# Patient Record
Sex: FEMALE | Race: WHITE | Employment: PART TIME | URBAN - METROPOLITAN AREA
[De-identification: names, ages, dates, MRNs, and addresses within clinical notes are randomized per-mention and may not be internally consistent; named-entity substitution may affect disease eponyms.]

---

## 2018-05-22 ENCOUNTER — OFFICE VISIT (OUTPATIENT)
Dept: NEUROLOGY | Age: 43
End: 2018-05-22

## 2018-05-22 VITALS
OXYGEN SATURATION: 98 % | SYSTOLIC BLOOD PRESSURE: 120 MMHG | HEART RATE: 71 BPM | HEIGHT: 67 IN | WEIGHT: 246 LBS | BODY MASS INDEX: 38.61 KG/M2 | DIASTOLIC BLOOD PRESSURE: 80 MMHG

## 2018-05-22 DIAGNOSIS — R20.2 PARESTHESIA: ICD-10-CM

## 2018-05-22 DIAGNOSIS — G43.009 MIGRAINE WITHOUT AURA AND WITHOUT STATUS MIGRAINOSUS, NOT INTRACTABLE: Primary | ICD-10-CM

## 2018-05-22 RX ORDER — TOPIRAMATE 100 MG/1
TABLET, FILM COATED ORAL 2 TIMES DAILY
COMMUNITY
End: 2018-05-22 | Stop reason: DRUGHIGH

## 2018-05-22 RX ORDER — TOPIRAMATE 100 MG/1
100 CAPSULE, EXTENDED RELEASE ORAL DAILY
Qty: 30 CAP | Refills: 2 | Status: SHIPPED | OUTPATIENT
Start: 2018-05-22 | End: 2018-06-22 | Stop reason: SDUPTHER

## 2018-05-22 RX ORDER — RIZATRIPTAN BENZOATE 10 MG/1
10 TABLET ORAL
Qty: 9 TAB | Refills: 2 | Status: SHIPPED | OUTPATIENT
Start: 2018-05-22 | End: 2019-11-08 | Stop reason: SDUPTHER

## 2018-05-22 NOTE — PATIENT INSTRUCTIONS
1.  Start Trokendi  mg daily  2. Discontinue Topamax  3. Rizatriptan refilled  4. Follow-up in 3 months      A Healthy Lifestyle: Care Instructions  Your Care Instructions    A healthy lifestyle can help you feel good, stay at a healthy weight, and have plenty of energy for both work and play. A healthy lifestyle is something you can share with your whole family. A healthy lifestyle also can lower your risk for serious health problems, such as high blood pressure, heart disease, and diabetes. You can follow a few steps listed below to improve your health and the health of your family. Follow-up care is a key part of your treatment and safety. Be sure to make and go to all appointments, and call your doctor if you are having problems. It's also a good idea to know your test results and keep a list of the medicines you take. How can you care for yourself at home? · Do not eat too much sugar, fat, or fast foods. You can still have dessert and treats now and then. The goal is moderation. · Start small to improve your eating habits. Pay attention to portion sizes, drink less juice and soda pop, and eat more fruits and vegetables. ¨ Eat a healthy amount of food. A 3-ounce serving of meat, for example, is about the size of a deck of cards. Fill the rest of your plate with vegetables and whole grains. ¨ Limit the amount of soda and sports drinks you have every day. Drink more water when you are thirsty. ¨ Eat at least 5 servings of fruits and vegetables every day. It may seem like a lot, but it is not hard to reach this goal. A serving or helping is 1 piece of fruit, 1 cup of vegetables, or 2 cups of leafy, raw vegetables. Have an apple or some carrot sticks as an afternoon snack instead of a candy bar. Try to have fruits and/or vegetables at every meal.  · Make exercise part of your daily routine. You may want to start with simple activities, such as walking, bicycling, or slow swimming.  Try to be active 30 to 60 minutes every day. You do not need to do all 30 to 60 minutes all at once. For example, you can exercise 3 times a day for 10 or 20 minutes. Moderate exercise is safe for most people, but it is always a good idea to talk to your doctor before starting an exercise program.  · Keep moving. Erin [place] the lawn, work in the garden, or [place]. Take the stairs instead of the elevator at work. · If you smoke, quit. People who smoke have an increased risk for heart attack, stroke, cancer, and other lung illnesses. Quitting is hard, but there are ways to boost your chance of quitting tobacco for good. ¨ Use nicotine gum, patches, or lozenges. ¨ Ask your doctor about stop-smoking programs and medicines. ¨ Keep trying. In addition to reducing your risk of diseases in the future, you will notice some benefits soon after you stop using tobacco. If you have shortness of breath or asthma symptoms, they will likely get better within a few weeks after you quit. · Limit how much alcohol you drink. Moderate amounts of alcohol (up to 2 drinks a day for men, 1 drink a day for women) are okay. But drinking too much can lead to liver problems, high blood pressure, and other health problems. Family health  If you have a family, there are many things you can do together to improve your health. · Eat meals together as a family as often as possible. · Eat healthy foods. This includes fruits, vegetables, lean meats and dairy, and whole grains. · Include your family in your fitness plan. Most people think of activities such as jogging or tennis as the way to fitness, but there are many ways you and your family can be more active. Anything that makes you breathe hard and gets your heart pumping is exercise. Here are some tips:  ¨ Walk to do errands or to take your child to school or the bus. ¨ Go for a family bike ride after dinner instead of watching TV. Where can you learn more?   Go to http://milli-chris.info/. Enter Z351 in the search box to learn more about \"A Healthy Lifestyle: Care Instructions. \"  Current as of: May 12, 2017  Content Version: 11.4  © 8741-7303 Healthwise, [employer]. Care instructions adapted under license by [employer] (which disclaims liability or warranty for this information). If you have questions about a medical condition or this instruction, always ask your healthcare professional. [place] [address] any warranty or liability for your use of this information. Please be advised there is a $25 fee for all paperwork to be completed from our  providers. This is to be paid by the patient prior to picking up the completed forms.

## 2018-05-22 NOTE — PROGRESS NOTES
Neurology Note      18    Chief Complaint   Patient presents with    New Patient     Migraines        HPI/Subjective  Ritu Parnell is a 41 y.o. female who presented to the neurology office for management of headaches. The patient started having headaches when she was in college. With time it has been getting better. She usually gets a headache once a month and it is in the right occipital area and then it radiates to the frontal area. It is associated with photophobia and phonophobia. She does not get any nausea or vomiting now but in the past she has been nauseous with her headaches. She takes Maxalt and the headaches resolved in around 30 minutes. If she does not take any medications the headaches can last for 2 days. The headaches are 10 out of 10 in severity. She was living in [place] but moved to [place] in 2017. She is also having mild tingling in her feet. She does have history of kidney stones ×1 but still wants to continue to take the Topamax. Risk Factors for headaches  Smokin  Coffee: 2 cups/day  Tea: 0 cups/day  Soda: Rarely cans/day  Water: 8 glasses/day  Sleeps at 10:30 PM and wakes up at 6:30 AM . She does not snore. Medications tried  Abortive therapy:  Rizatriptan    Preventative therapy:  Topamax 100 mg p.o. nightly        Current Outpatient Prescriptions   Medication Sig    cetirizine HCl (ZYRTEC PO) Take  by mouth.  multivit-minerals/ferrous fum (MULTI VITAMIN PO) Take  by mouth.  rizatriptan (MAXALT) 10 mg tablet Take 1 Tab by mouth once as needed for Migraine for up to 1 dose. May repeat in 2 hours if needed    TROKENDI  mg capsule Take 1 Cap by mouth daily. No current facility-administered medications for this visit. Allergies   Allergen Reactions    Codeine Rash     Past Medical History:   Diagnosis Date    Neurological disorder      History reviewed. No pertinent surgical history. History reviewed.  No pertinent family history. Social History   Substance Use Topics    Smoking status: Never Smoker    Smokeless tobacco: Never Used    Alcohol use None       REVIEW OF SYSTEMS:   A ten system review of constitutional, cardiovascular, respiratory, musculoskeletal, endocrine, skin, SHEENT, genitourinary, psychiatric and neurologic systems was obtained and is unremarkable with the exception of Fatigue, frequent headaches, muscle pain, muscle weakness, neuropathy      EXAMINATION:   Visit Vitals    /80    Pulse 71    Ht 5' 7\" (1.702 m)    Wt 246 lb (111.6 kg)    SpO2 98%    BMI 38.53 kg/m2        General:   General appearance: Pt is in no acute distress   Distal pulses are preserved  Fundoscopic Exam: Normal    Neurological Examination:   Mental Status: AAO x3. Speech is fluent. Follows commands, has normal fund of knowledge, attention, short term recall, comprehension and insight. Cranial Nerves: Visual fields are full. PERRL, Extraocular movements are full. Facial sensation intact. Facial movement intact. Hearing intact to conversation. Palate elevates symmetrically. Shoulder shrug symmetric. Tongue midline. Motor: Strength is 5/5 in all 4 ext. No atrophy. Tone: Normal    Sensation: Normal to light touch    Reflexes: DTRs 2+ throughout. Coordination/Cerebellar: Intact to finger-nose-finger     Gait: Casual gait is normal.     Skin: No significant bruising or lacerations. Laboratory review:   No results found for this or any previous visit. Imaging review:  None    Documentation review:  None    Assessment/Plan:   Sobia Mckeon is a 41 y.o. female who presented to the neurology office for management of migraines without aura. She is presently taking Topamax 100 mg p.o. nightly and rizatriptan 10 mg as needed. With the medications are helping her but she is having paresthesia in her feet which could be secondary Topamax. We will switch her Topamax to Trokendi 100 mg p.o. daily.   Have refilled her rizatriptan and Trokendi for 3 months. Follow-up in 3 months      PHQ over the last two weeks 5/22/2018   Little interest or pleasure in doing things Not at all   Feeling down, depressed or hopeless Not at all   Total Score PHQ 2 0     Primary care to address possible depression if PHQ-9 score is more than 9. ICD-10-CM ICD-9-CM    1. Migraine without aura and without status migrainosus, not intractable G43.009 346.10 rizatriptan (MAXALT) 10 mg tablet      TROKENDI  mg capsule   2. Paresthesia R20.2 782.0       Thank you for allowing me to participate in the care of Ms. Martinez. Please feel free to contact me if you have any questions. Monico Barbosa MD  Neurologist    CC: No primary care provider on file. Fax: None    This note was created using voice recognition software. Despite editing, there may be syntax errors.

## 2018-05-22 NOTE — MR AVS SNAPSHOT
[address], 
[address], [address] [facility] 
[phone] Patient: Roberta Carvalho MRN: [id number] :1975 Visit Information Date & Time Provider Department Dept. Phone Encounter #  
 2018 11:00 AM Raul Garcia MD [facility]rology [facility] at [facility] [phone] [id number] Upcoming Health Maintenance Date Due DTaP/Tdap/Td series (1 - Tdap) 1998 PAP AKA CERVICAL CYTOLOGY 1998 Influenza Age 5 to Adult 2018 Allergies as of 2018  Review Complete On: 2018 By: Raul Garcia MD  
  
 Severity Noted Reaction Type Reactions Codeine  2018    Rash Current Immunizations  Never Reviewed No immunizations on file. Not reviewed this visit You Were Diagnosed With   
  
 Codes Comments Migraine without aura and without status migrainosus, not intractable    -  Primary ICD-10-CM: G43.009 ICD-9-CM: 346.10 Vitals BP Pulse Height(growth percentile) Weight(growth percentile) SpO2 BMI  
 120/80 71 5' 7\" (1.702 m) 246 lb (111.6 kg) 98% 38.53 kg/m2 Smoking Status Never Smoker BMI and BSA Data Body Mass Index Body Surface Area 38.53 kg/m 2 2.3 m 2 Preferred Pharmacy Pharmacy Name Phone [id number] [place], [address] [phone] Your Updated Medication List  
  
   
This list is accurate as of 18 11:41 AM.  Always use your most recent med list.  
  
  
  
  
 MULTI VITAMIN PO Take  by mouth.  
  
 rizatriptan 10 mg tablet Commonly known as:  Kalyani Mighty Take 1 Tab by mouth once as needed for Migraine for up to 1 dose. May repeat in 2 hours if needed TROKENDI  mg capsule Generic drug:  topiramate ER Take 1 Cap by mouth daily. ZYRTEC PO Take  by mouth. Prescriptions Sent to Pharmacy Refills  
 rizatriptan (MAXALT) 10 mg tablet 2 Sig: Take 1 Tab by mouth once as needed for Migraine for up to 1 dose. May repeat in 2 hours if needed Class: Normal  
 Pharmacy: [facility]  #: [phone] Route: Oral  
 TROKENDI  mg capsule 2 Sig: Take 1 Cap by mouth daily. Class: Normal  
 Pharmacy: [facility] Ph #: [phone] Route: Oral  
  
Patient Instructions 1. Start Trokendi  mg daily 2. Discontinue Topamax 3. Rizatriptan refilled 4. Follow-up in 3 months A Healthy Lifestyle: Care Instructions Your Care Instructions A healthy lifestyle can help you feel good, stay at a healthy weight, and have plenty of energy for both work and play. A healthy lifestyle is something you can share with your whole family. A healthy lifestyle also can lower your risk for serious health problems, such as high blood pressure, heart disease, and diabetes. You can follow a few steps listed below to improve your health and the health of your family. Follow-up care is a key part of your treatment and safety. Be sure to make and go to all appointments, and call your doctor if you are having problems. It's also a good idea to know your test results and keep a list of the medicines you take. How can you care for yourself at home? · Do not eat too much sugar, fat, or fast foods. You can still have dessert and treats now and then. The goal is moderation. · Start small to improve your eating habits. Pay attention to portion sizes, drink less juice and soda pop, and eat more fruits and vegetables. ¨ Eat a healthy amount of food. A 3-ounce serving of meat, for example, is about the size of a deck of cards. Fill the rest of your plate with vegetables and whole grains. ¨ Limit the amount of soda and sports drinks you have every day. Drink more water when you are thirsty. ¨ Eat at least 5 servings of fruits and vegetables every day. It may seem like a lot, but it is not hard to reach this goal. A serving or helping is 1 piece of fruit, 1 cup of vegetables, or 2 cups of leafy, raw vegetables. Have an apple or some carrot sticks as an afternoon snack instead of a candy bar. Try to have fruits and/or vegetables at every meal. 
· Make exercise part of your daily routine. You may want to start with simple activities, such as walking, bicycling, or slow swimming. Try to be active 30 to 60 minutes every day. You do not need to do all 30 to 60 minutes all at once. For example, you can exercise 3 times a day for 10 or 20 minutes. Moderate exercise is safe for most people, but it is always a good idea to talk to your doctor before starting an exercise program. 
· Keep moving. [place] the lawn, work in the garden, or [employer]. Take the stairs instead of the elevator at work. · If you smoke, quit. People who smoke have an increased risk for heart attack, stroke, cancer, and other lung illnesses. Quitting is hard, but there are ways to boost your chance of quitting tobacco for good. ¨ Use nicotine gum, patches, or lozenges. ¨ Ask your doctor about stop-smoking programs and medicines. ¨ Keep trying. In addition to reducing your risk of diseases in the future, you will notice some benefits soon after you stop using tobacco. If you have shortness of breath or asthma symptoms, they will likely get better within a few weeks after you quit. · Limit how much alcohol you drink. Moderate amounts of alcohol (up to 2 drinks a day for men, 1 drink a day for women) are okay. But drinking too much can lead to liver problems, high blood pressure, and other health problems. Family health If you have a family, there are many things you can do together to improve your health. · Eat meals together as a family as often as possible. · Eat healthy foods. This includes fruits, vegetables, lean meats and dairy, and whole grains. · Include your family in your fitness plan. Most people think of activities such as jogging or tennis as the way to fitness, but there are many ways you and your family can be more active. Anything that makes you breathe hard and gets your heart pumping is exercise. Here are some tips: 
¨ Walk to do errands or to take your child to school or the bus. ¨ Go for a family bike ride after dinner instead of watching TV. Where can you learn more? Go to http://milli-chris.info/. Enter C048 in the search box to learn more about \"A Healthy Lifestyle: Care Instructions. \" Current as of: May 12, 2017 Content Version: 11.4 © 2054-7979 [employer]. Care instructions adapted under license by [employer] (which disclaims liability or warranty for this information). If you have questions about a medical condition or this instruction, always ask your healthcare professional. [address] any warranty or liability for your use of this information. Please be advised there is a $25 fee for all paperwork to be completed from our  providers. This is to be paid by the patient prior to picking up the completed forms. Introducing [facility] & HEALTH SERVICES! Rossy Lang introduces [employer] patient portal. Now you can access parts of your medical record, email your doctor's office, and request medication refills online. 1. In your internet browser, go to [email or web address] 2. Click on the First Time User? Click Here link in the Sign In box. You will see the New Member Sign Up page. 3. Enter your Nualight Access Code exactly as it appears below. You will not need to use this code after youve completed the sign-up process.  If you do not sign up before the expiration date, you must request a new code. 
 
· [employer] Access Code: [email or web address] Expires: 8/20/2018 10:35 AM 
 
4. Enter the last four digits of your Social Security Number (xxxx) and Date of Birth (mm/dd/yyyy) as indicated and click Submit. You will be taken to the next sign-up page. 5. Create a [employer] ID. This will be your [employer] login ID and cannot be changed, so think of one that is secure and easy to remember. 6. Create a [employer] password. You can change your password at any time. 7. Enter your Password Reset Question and Answer. This can be used at a later time if you forget your password. 8. Enter your e-mail address. You will receive e-mail notification when new information is available in [address] 9. Click Sign Up. You can now view and download portions of your medical record. 10. Click the Download Summary menu link to download a portable copy of your medical information. If you have questions, please visit the Frequently Asked Questions section of the [employer] website. Remember, [employer] is NOT to be used for urgent needs. For medical emergencies, dial 911. Now available from your iPhone and Android! Please provide this summary of care documentation to your next provider. If you have any questions after today's visit, please call [phone].

## 2018-06-22 DIAGNOSIS — G43.009 MIGRAINE WITHOUT AURA AND WITHOUT STATUS MIGRAINOSUS, NOT INTRACTABLE: ICD-10-CM

## 2018-06-22 RX ORDER — TOPIRAMATE 100 MG/1
100 CAPSULE, EXTENDED RELEASE ORAL DAILY
Qty: 90 CAP | Refills: 0 | Status: SHIPPED | OUTPATIENT
Start: 2018-06-22 | End: 2018-10-16 | Stop reason: SDUPTHER

## 2018-06-22 NOTE — TELEPHONE ENCOUNTER
[facility] to [facility] a [facility] the patient's \"Trokendi\" they stated that patient is requesting a 90 day supply.       [phone]

## 2018-08-22 ENCOUNTER — OFFICE VISIT (OUTPATIENT)
Dept: NEUROLOGY | Age: 43
End: 2018-08-22

## 2018-08-22 VITALS
BODY MASS INDEX: 38.77 KG/M2 | OXYGEN SATURATION: 98 % | SYSTOLIC BLOOD PRESSURE: 118 MMHG | WEIGHT: 247 LBS | HEART RATE: 72 BPM | HEIGHT: 67 IN | DIASTOLIC BLOOD PRESSURE: 78 MMHG

## 2018-08-22 DIAGNOSIS — G43.009 MIGRAINE WITHOUT AURA AND WITHOUT STATUS MIGRAINOSUS, NOT INTRACTABLE: Primary | ICD-10-CM

## 2018-08-22 DIAGNOSIS — R20.2 PARESTHESIA: ICD-10-CM

## 2018-08-22 NOTE — MR AVS SNAPSHOT
[address], 
[address], [address] [facility] 
[phone] Patient: Ger Hernandez MRN: [id number] :1975 Visit Information Date & Time Provider Department Dept. Phone Encounter #  
 2018 11:00 AM Magalys Wilburn MD Neurology Clinic at [facility] [phone] [id number] Your Appointments 2019 11:00 AM  
Follow Up with Magalys Wilburn MD  
[facility]: follow up headaches 18 [address] [facility], 
[address] [place] [address], [address] [address]  
[address] [facility], [address] [place] [address], [address] [address] Upcoming Health Maintenance Date Due DTaP/Tdap/Td series (1 - Tdap) 1998 PAP AKA CERVICAL CYTOLOGY 1998 Influenza Age 5 to Adult 2018 Allergies as of 2018  Review Complete On: 2018 By: Magalys Wilburn MD  
  
 Severity Noted Reaction Type Reactions Codeine  2018    Rash Current Immunizations  Never Reviewed No immunizations on file. Not reviewed this visit You Were Diagnosed With   
  
 Codes Comments Migraine without aura and without status migrainosus, not intractable    -  Primary ICD-10-CM: G43.009 ICD-9-CM: 346.10 Paresthesia     ICD-10-CM: R20.2 ICD-9-CM: 010. 0 Vitals BP Pulse Height(growth percentile) Weight(growth percentile) SpO2 BMI  
 118/78 72 5' 7\" (1.702 m) 247 lb (112 kg) 98% 38.69 kg/m2 Smoking Status Never Smoker BMI and BSA Data Body Mass Index Body Surface Area  
 38.69 kg/m 2 2.3 m 2 Preferred Pharmacy Pharmacy Name Phone [id number] [place] [address] [phone] Your Updated Medication List  
  
   
 This list is accurate as of 8/22/18 11:48 AM.  Always use your most recent med list.  
  
  
  
  
 MULTI VITAMIN PO Take  by mouth.  
  
 rizatriptan 10 mg tablet Commonly known as:  Madelyn Warner Take 1 Tab by mouth once as needed for Migraine for up to 1 dose. May repeat in 2 hours if needed TROKENDI  mg capsule Generic drug:  topiramate ER Take 1 Cap by mouth daily. ZYRTEC PO Take  by mouth. Patient Instructions 1. Follow-up in 6 months Office Policies · Phone calls/patient messages: Please allow up to 24 hours for someone in the office to contact you about your call or message. Be mindful your provider may be out of the office or your message may require further review. We encourage you to use [employer] for your messages as this is a faster, more efficient way to communicate with our office · Medication Refills: 
Prescription medications require up to 48 business hours to process. We encourage you to use [employer] for your refills. For controlled medications: Please allow up to 72 business hours to process. Certain medications may require you to  a written prescription at our office. NO narcotic/controlled medications will be prescribed after 4pm Monday through Friday or on weekends · Form/Paperwork Completion: 
Please note there is a $25 fee for all paperwork completed by our providers. We ask that you allow 7-14 business days. Pre-payment is due prior to picking up/faxing the completed form. You may also download your forms to [employer] to have your doctor print off. Introducing \[facility]\"" & HEALTH SERVICES! [employer] introduces [employer] patient portal. Now you can access parts of your medical record, email your doctor's office, and request medication refills online. 1. In your internet browser, go to [email or web address] 2. Click on the First Time User? Click Here link in the Sign In box. You will see the New Member Sign Up page. 3. Enter your IPDIA Access Code exactly as it appears below. You will not need to use this code after youve completed the sign-up process. If you do not sign up before the expiration date, you must request a new code. · IPDIA Access Code: [email or web address] Expires: 11/20/2018 11:48 AM 
 
4. Enter the last four digits of your Social Security Number (xxxx) and Date of Birth (mm/dd/yyyy) as indicated and click Submit. You will be taken to the next sign-up page. 5. Create a IPDIA ID. This will be your IPDIA login ID and cannot be changed, so think of one that is secure and easy to remember. 6. Create a IPDIA password. You can change your password at any time. 7. Enter your Password Reset Question and Answer. This can be used at a later time if you forget your password. 8. Enter your e-mail address. You will receive e-mail notification when new information is available in [address] 9. Click Sign Up. You can now view and download portions of your medical record. 10. Click the Download Summary menu link to download a portable copy of your medical information. If you have questions, please visit the Frequently Asked Questions section of the IPDIA website. Remember, I[employer]IA is NOT to be used for urgent needs. For medical emergencies, dial 911. Now available from your iPhone and Android! Please provide this summary of care documentation to your next provider. If you have any questions after today's visit, please call [phone].

## 2018-08-22 NOTE — PATIENT INSTRUCTIONS
1.  Follow-up in 6 months    Office Policies  · Phone calls/patient messages:  Please allow up to 24 hours for someone in the office to contact you about your call or message. Be mindful your provider may be out of the office or your message may require further review. We encourage you to use [employer] for your messages as this is a faster, more efficient way to communicate with our office  · Medication Refills:  Prescription medications require up to 48 business hours to process. We encourage you to use [employer] for your refills. For controlled medications: Please allow up to 72 business hours to process. Certain medications may require you to  a written prescription at our office. NO narcotic/controlled medications will be prescribed after 4pm Monday through Friday or on weekends  · Form/Paperwork Completion:  Please note there is a $25 fee for all paperwork completed by our providers. We ask that you allow 7-14 business days. Pre-payment is due prior to picking up/faxing the completed form. You may also download your forms to [employer] to have your doctor print off.

## 2019-02-19 ENCOUNTER — OFFICE VISIT (OUTPATIENT)
Dept: NEUROLOGY | Age: 44
End: 2019-02-19

## 2019-02-19 VITALS
OXYGEN SATURATION: 98 % | SYSTOLIC BLOOD PRESSURE: 128 MMHG | DIASTOLIC BLOOD PRESSURE: 75 MMHG | WEIGHT: 247 LBS | BODY MASS INDEX: 38.77 KG/M2 | HEART RATE: 74 BPM | HEIGHT: 67 IN

## 2019-02-19 DIAGNOSIS — G43.009 MIGRAINE WITHOUT AURA AND WITHOUT STATUS MIGRAINOSUS, NOT INTRACTABLE: Primary | ICD-10-CM

## 2019-02-19 DIAGNOSIS — R20.2 PARESTHESIA: ICD-10-CM

## 2019-02-19 NOTE — LETTER
2019 11:15 AM 
 
Patient:    Elizabeth White YOB: 1975 Date of Visit:    2019 Dear No primary care provider on file. Thank you for referring Ms. Elizabeth White to me for evaluation/treatment. Below are the relevant portions of my assessment and plan of care. Neurology Note Chief Complaint Patient presents with  Follow-up  
  headache HPI/Subjective Elizabeth White is a 41 y.o. female who presented to the neurology office for management of headaches. The patient started having headaches when she was in college. With time it has been getting better. She usually gets a headache once a month and it is in the right occipital area and then it radiates to the frontal area. It is associated with photophobia and phonophobia. She does not get any nausea or vomiting now but in the past she has been nauseous with her headaches. She takes Maxalt and the headaches resolved in around 30 minutes. If she does not take any medications the headaches can last for 2 days. The headaches are 10 out of 10 in severity. She was living in [place] but moved to [place] in 2017. She does have history of kidney stones ×1 but still wants to continue to take the Trokendi. Risk Factors for headaches Smokin Coffee: 2 cups/day Tea: 0 cups/day Soda: Rarely cans/day Water: 8 glasses/day Sleeps at 10:30 PM and wakes up at 6:30 AM . She does not snore. Medications tried Abortive therapy: 
Rizatriptan Preventative therapy: 
Topamax 100 mg p.o. nightly Current Outpatient Medications Medication Sig  TROKENDI  mg capsule TAKE 1 CAPSULE BY MOUTH EVERY DAY  cetirizine HCl (ZYRTEC PO) Take  by mouth.  multivit-minerals/ferrous fum (MULTI VITAMIN PO) Take  by mouth.  rizatriptan (MAXALT) 10 mg tablet Take 1 Tab by mouth once as needed for Migraine for up to 1 dose. May repeat in 2 hours if needed No current facility-administered medications for this visit. Allergies Allergen Reactions  Codeine Rash Past Medical History:  
Diagnosis Date  Neurological disorder No past surgical history on file. No family history on file. Social History Tobacco Use  Smoking status: Never Smoker  Smokeless tobacco: Never Used Substance Use Topics  Alcohol use: Not on file  Drug use: Not on file REVIEW OF SYSTEMS:  
A ten system review of constitutional, cardiovascular, respiratory, musculoskeletal, endocrine, skin, SHEENT, genitourinary, psychiatric and neurologic systems was obtained and is unremarkable with the exception of Fatigue, frequent headaches, muscle pain, muscle weakness, neuropathy EXAMINATION:  
Visit Vitals /75 Pulse 74 Ht 5' 7\" (1.702 m) Wt 247 lb (112 kg) SpO2 98% BMI 38.69 kg/m² General:  
General appearance: Pt is in no acute distress Distal pulses are preserved Neurological Examination:  
Mental Status: AAO x3. Speech is fluent. Follows commands, has normal fund of knowledge, attention, short term recall, comprehension and insight. Cranial Nerves: Visual fields are full. PERRL, Extraocular movements are full. Facial sensation intact. Facial movement intact. Hearing intact to conversation. Palate elevates symmetrically. Shoulder shrug symmetric. Tongue midline. Motor: Strength is 5/5 in all 4 ext. No atrophy. Tone: Normal 
 
Sensation: Normal to light touch Coordination/Cerebellar: Intact to finger-nose-finger Gait: Casual gait is normal.  
 
Skin: No significant bruising or lacerations. Laboratory review: No results found for this or any previous visit. Imaging review: 
None Documentation review: 
None Assessment/Plan: Nelson Burton is a 41 y.o. female who presented to the neurology office for management of migraines without aura.   She is presently taking Trokendi 100 mg p.o. daily. Paresthesia has improved. She takes rizatriptan prn. Overall headaches are well controlled. Follow-up in 6 months Over 25 minutes was spent with the patient of which > 50% of the visit was spent counseling on diagnosis, management, and treatment of the diagnosis. I did discuss about Trokendi and kidney stones. 3 most recent PHQ Screens 5/22/2018 Little interest or pleasure in doing things Not at all Feeling down, depressed, irritable, or hopeless Not at all Total Score PHQ 2 0 Primary care to address possible depression if PHQ-9 score is more than 9. ICD-10-CM ICD-9-CM 1. Migraine without aura and without status migrainosus, not intractable G43.009 346.10   
2. Paresthesia R20.2 782.0 Thank you for allowing me to participate in the care of Ms. Martinez. Please feel free to contact me if you have any questions. Jenna Freeman MD 
Neurologist 
 
CC: No primary care provider on file. Fax: None This note was created using voice recognition software. Despite editing, there may be syntax errors. If you have questions, please do not hesitate to call me. I look forward to following Ms. Martinez along with you. Sincerely, Jenna Freeman MD

## 2019-02-19 NOTE — PROGRESS NOTES
Neurology Note      Chief Complaint   Patient presents with    Follow-up     headache       HPI/Subjective  Verlena Coma is a 41 y.o. female who presented to the neurology office for management of headaches. The patient started having headaches when she was in college. With time it has been getting better. She usually gets a headache once a month and it is in the right occipital area and then it radiates to the frontal area. It is associated with photophobia and phonophobia. She does not get any nausea or vomiting now but in the past she has been nauseous with her headaches. She takes Maxalt and the headaches resolved in around 30 minutes. If she does not take any medications the headaches can last for 2 days. The headaches are 10 out of 10 in severity. She was living in [place] but moved to [facility] in 2017. She does have history of kidney stones ×1 but still wants to continue to take the Trokendi. Risk Factors for headaches  Smokin  Coffee: 2 cups/day  Tea: 0 cups/day  Soda: Rarely cans/day  Water: 8 glasses/day  Sleeps at 10:30 PM and wakes up at 6:30 AM . She does not snore. Medications tried  Abortive therapy:  Rizatriptan    Preventative therapy:  Topamax 100 mg p.o. nightly    Current Outpatient Medications   Medication Sig    TROKENDI  mg capsule TAKE 1 CAPSULE BY MOUTH EVERY DAY    cetirizine HCl (ZYRTEC PO) Take  by mouth.  multivit-minerals/ferrous fum (MULTI VITAMIN PO) Take  by mouth.  rizatriptan (MAXALT) 10 mg tablet Take 1 Tab by mouth once as needed for Migraine for up to 1 dose. May repeat in 2 hours if needed     No current facility-administered medications for this visit. Allergies   Allergen Reactions    Codeine Rash     Past Medical History:   Diagnosis Date    Neurological disorder      No past surgical history on file. No family history on file.   Social History     Tobacco Use    Smoking status: Never Smoker    Smokeless tobacco: Never Used   Substance Use Topics    Alcohol use: Not on file    Drug use: Not on file       REVIEW OF SYSTEMS:   A ten system review of constitutional, cardiovascular, respiratory, musculoskeletal, endocrine, skin, SHEENT, genitourinary, psychiatric and neurologic systems was obtained and is unremarkable with the exception of Fatigue, frequent headaches, muscle pain, muscle weakness, neuropathy      EXAMINATION:   Visit Vitals  /75   Pulse 74   Ht 5' 7\" (1.702 m)   Wt 247 lb (112 kg)   SpO2 98%   BMI 38.69 kg/m²        General:   General appearance: Pt is in no acute distress   Distal pulses are preserved    Neurological Examination:   Mental Status: AAO x3. Speech is fluent. Follows commands, has normal fund of knowledge, attention, short term recall, comprehension and insight. Cranial Nerves: Visual fields are full. PERRL, Extraocular movements are full. Facial sensation intact. Facial movement intact. Hearing intact to conversation. Palate elevates symmetrically. Shoulder shrug symmetric. Tongue midline. Motor: Strength is 5/5 in all 4 ext. No atrophy. Tone: Normal    Sensation: Normal to light touch     Coordination/Cerebellar: Intact to finger-nose-finger     Gait: Casual gait is normal.     Skin: No significant bruising or lacerations. Laboratory review:   No results found for this or any previous visit. Imaging review:  None    Documentation review:  None    Assessment/Plan:   Radha Rosenberg is a 41 y.o. female who presented to the neurology office for management of migraines without aura. She is presently taking Trokendi 100 mg p.o. daily. Paresthesia has improved. She takes rizatriptan prn. Overall headaches are well controlled. Follow-up in 6 months    Over 25 minutes was spent with the patient of which > 50% of the visit was spent counseling on diagnosis, management, and treatment of the diagnosis. I did discuss about Trokendi and kidney stones.       3 most recent PHQ Screens 5/22/2018   Little interest or pleasure in doing things Not at all   Feeling down, depressed, irritable, or hopeless Not at all   Total Score PHQ 2 0     Primary care to address possible depression if PHQ-9 score is more than 9. ICD-10-CM ICD-9-CM    1. Migraine without aura and without status migrainosus, not intractable G43.009 346.10    2. Paresthesia R20.2 782.0       Thank you for allowing me to participate in the care of Ms. Martinez. Please feel free to contact me if you have any questions. Dayna Ward MD  Neurologist    CC: No primary care provider on file. Fax: None    This note was created using voice recognition software. Despite editing, there may be syntax errors.

## 2019-02-19 NOTE — PATIENT INSTRUCTIONS
Office Policies  · Phone calls/patient messages:  Please allow up to 24 hours for someone in the office to contact you about your call or message. Be mindful your provider may be out of the office or your message may require further review. We encourage you to use [email or web address] for your messages as this is a faster, more efficient way to communicate with our office  · Medication Refills:  Prescription medications require up to 48 business hours to process. We encourage you to use [email or web address] for your refills. For controlled medications: Please allow up to 72 business hours to process. Certain medications may require you to  a written prescription at our office. NO narcotic/controlled medications will be prescribed after 4pm Monday through Friday or on weekends  · Form/Paperwork Completion:  Please note there is a $25 fee for all paperwork completed by our providers. We ask that you allow 7-14 business days. Pre-payment is due prior to picking up/faxing the completed form. You may also download your forms to [email or web address] to have your doctor print off.

## 2019-07-24 DIAGNOSIS — G43.009 MIGRAINE WITHOUT AURA AND WITHOUT STATUS MIGRAINOSUS, NOT INTRACTABLE: ICD-10-CM

## 2019-07-31 NOTE — TELEPHONE ENCOUNTER
Received refill 90 day request for luz elena   Last filled 10/16/18   Follow up appt.  With dr. Pedro Grant 8/21/19   Dr. Pedro Grant, please refill   l

## 2019-08-01 RX ORDER — TOPIRAMATE 100 MG/1
CAPSULE, EXTENDED RELEASE ORAL
Qty: 90 CAP | Refills: 3 | Status: SHIPPED | OUTPATIENT
Start: 2019-08-01 | End: 2020-10-15

## 2019-08-14 ENCOUNTER — OFFICE VISIT (OUTPATIENT)
Dept: NEUROLOGY | Age: 44
End: 2019-08-14

## 2019-08-14 VITALS
HEIGHT: 67 IN | SYSTOLIC BLOOD PRESSURE: 142 MMHG | WEIGHT: 245 LBS | HEART RATE: 68 BPM | DIASTOLIC BLOOD PRESSURE: 82 MMHG | OXYGEN SATURATION: 98 % | BODY MASS INDEX: 38.45 KG/M2

## 2019-08-14 DIAGNOSIS — G43.009 MIGRAINE WITHOUT AURA AND WITHOUT STATUS MIGRAINOSUS, NOT INTRACTABLE: ICD-10-CM

## 2019-08-14 DIAGNOSIS — R20.2 PARESTHESIA: Primary | ICD-10-CM

## 2019-08-14 NOTE — PROGRESS NOTES
Neurology Note      Chief Complaint   Patient presents with    Follow-up     migraine       HPI/Subjective  Daniel Bermudez is a 45 y.o. female who presented to the neurology office for management of headaches. The patient started having headaches when she was in college. With time it has been getting better. She usually gets a headache once a month and it is in the right occipital area and then it radiates to the frontal area. It is associated with photophobia and phonophobia. She does not get any nausea or vomiting now but in the past she has been nauseous with her headaches. She takes Maxalt and the headaches resolved in around 30 minutes. If she does not take any medications the headaches can last for 2 days. The headaches are 10 out of 10 in severity. She was living in [place] but moved to [place] in 2017. She does have history of kidney stones ×1 but still wants to continue to take the Trokendi. Headache frequency now: 0 - 1/month    Risk Factors for headaches  Smokin  Coffee: 2 cups/day  Tea: 0 cups/day  Soda: Rarely cans/day  Water: 8 glasses/day  Sleeps at 10:30 PM and wakes up at 6:30 AM . She does not snore. Medications tried  Abortive therapy:  Rizatriptan    Preventative therapy:  Topamax 100 mg p.o. nightly    Current Outpatient Medications   Medication Sig    TROKENDI  mg capsule TAKE 1 CAPSULE BY MOUTH EVERY DAY    cetirizine HCl (ZYRTEC PO) Take  by mouth.  multivit-minerals/ferrous fum (MULTI VITAMIN PO) Take  by mouth.  rizatriptan (MAXALT) 10 mg tablet Take 1 Tab by mouth once as needed for Migraine for up to 1 dose. May repeat in 2 hours if needed     No current facility-administered medications for this visit. Allergies   Allergen Reactions    Codeine Rash     Past Medical History:   Diagnosis Date    Neurological disorder      No past surgical history on file. No family history on file.   Social History     Tobacco Use    Smoking status: Never Smoker    Smokeless tobacco: Never Used   Substance Use Topics    Alcohol use: Not on file    Drug use: Not on file       REVIEW OF SYSTEMS:   A ten system review of constitutional, cardiovascular, respiratory, musculoskeletal, endocrine, skin, SHEENT, genitourinary, psychiatric and neurologic systems was obtained and is unremarkable with the exception of Fatigue, frequent headaches, muscle pain, muscle weakness, neuropathy      EXAMINATION:   Visit Vitals  /82   Pulse 68   Ht 5' 7\" (1.702 m)   Wt 245 lb (111.1 kg)   SpO2 98%   BMI 38.37 kg/m²        General:   General appearance: Pt is in no acute distress   Distal pulses are preserved    Neurological Examination:   Mental Status: AAO x3. Speech is fluent. Follows commands, has normal fund of knowledge, attention, short term recall, comprehension and insight. Cranial Nerves: Visual fields are full. PERRL, Extraocular movements are full. Facial sensation intact. Facial movement intact. Hearing intact to conversation. Palate elevates symmetrically. Shoulder shrug symmetric. Tongue midline. Motor: Strength is 5/5 in all 4 ext. No atrophy. Tone: Normal    Sensation: Normal to light touch     Coordination/Cerebellar: Intact to finger-nose-finger     Gait: Casual gait is normal.     Skin: No significant bruising or lacerations. Laboratory review:   No results found for this or any previous visit. Imaging review:  None    Documentation review:  None    Assessment/Plan:   Christian Blanco is a 45 y.o. female who presented to the neurology office for management of migraines without aura. She is presently taking Trokendi 100 mg p.o. daily. Paresthesia has improved. She takes rizatriptan prn. Overall headaches are well controlled. Follow-up in 6 months    Over 25 minutes was spent with the patient of which > 50% of the visit was spent counseling on diagnosis, management, and treatment of the diagnosis.   I did discuss about Trokendi and kidney stones. 3 most recent PHQ Screens 5/22/2018   Little interest or pleasure in doing things Not at all   Feeling down, depressed, irritable, or hopeless Not at all   Total Score PHQ 2 0     Primary care to address possible depression if PHQ-9 score is more than 9. ICD-10-CM ICD-9-CM    1. Paresthesia R20.2 782.0    2. Migraine without aura and without status migrainosus, not intractable G43.009 346.10       Thank you for allowing me to participate in the care of Ms. Martinez. Please feel free to contact me if you have any questions. Desiree Parnell MD  Neurologist    CC: No primary care provider on file. Fax: None    This note was created using voice recognition software. Despite editing, there may be syntax errors.

## 2019-08-14 NOTE — PATIENT INSTRUCTIONS

## 2019-09-19 DIAGNOSIS — G43.009 MIGRAINE WITHOUT AURA AND WITHOUT STATUS MIGRAINOSUS, NOT INTRACTABLE: Primary | ICD-10-CM

## 2019-11-08 DIAGNOSIS — G43.009 MIGRAINE WITHOUT AURA AND WITHOUT STATUS MIGRAINOSUS, NOT INTRACTABLE: ICD-10-CM

## 2019-11-08 RX ORDER — RIZATRIPTAN BENZOATE 10 MG/1
10 TABLET ORAL
Qty: 9 TAB | Refills: 2 | Status: SHIPPED | OUTPATIENT
Start: 2019-11-08 | End: 2019-11-08

## 2020-09-01 ENCOUNTER — HOSPITAL ENCOUNTER (OUTPATIENT)
Dept: ULTRASOUND IMAGING | Age: 45
Discharge: HOME | End: 2020-09-01
Attending: NURSE PRACTITIONER
Payer: COMMERCIAL

## 2020-09-01 DIAGNOSIS — R22.31 MASS OF RIGHT UPPER EXTREMITY: ICD-10-CM

## 2020-09-01 PROCEDURE — 76881 US COMPL JOINT R-T W/IMG: CPT

## 2020-09-21 ENCOUNTER — OFFICE VISIT (OUTPATIENT)
Dept: SURGERY | Age: 45
End: 2020-09-21
Payer: COMMERCIAL

## 2020-09-21 ENCOUNTER — TELEPHONE (OUTPATIENT)
Dept: SURGERY | Age: 45
End: 2020-09-21

## 2020-09-21 VITALS
BODY MASS INDEX: 39.71 KG/M2 | TEMPERATURE: 98.2 F | OXYGEN SATURATION: 96 % | DIASTOLIC BLOOD PRESSURE: 82 MMHG | HEIGHT: 67 IN | WEIGHT: 253 LBS | RESPIRATION RATE: 16 BRPM | HEART RATE: 89 BPM | SYSTOLIC BLOOD PRESSURE: 123 MMHG

## 2020-09-21 DIAGNOSIS — D17.21 LIPOMA OF RIGHT UPPER EXTREMITY: Primary | ICD-10-CM

## 2020-09-21 PROBLEM — E66.9 CLASS 2 OBESITY IN ADULT: Status: ACTIVE | Noted: 2020-09-21

## 2020-09-21 PROCEDURE — 99202 OFFICE O/P NEW SF 15 MIN: CPT | Performed by: SURGERY

## 2020-09-21 NOTE — LETTER
9/21/2020 1:58 PM 
 
Ms. Rosita Andrade [address] [address] Surgery is scheduled as follows: 
 
Surgery date: 10/02/2020      Arrival time: 10:30 AM     Start time: 12:30 PM 
 
Location: [facility] [place] [address]. Main Entrance [address] [facility], [facility] [address] DO NOT EAT ANYTHING PAST MIDNIGHT THE NIGHT BEFORE SURGERY - YOU MAY HAVE CLEAR LIQUIDS UP TO ONE HOUR PRIOR TO YOUR ARRIVAL TIME. 
______________________________________________________________________ *You will be contacted to schedule your MANDATORY COVID testing FOR 09/28/2020, which must be completed 4 days prior to your surgery. Please note that you will be instructed to quarantine yourself from the time you take your COVID test until the time of your surgery. If you are unable to have your COVID testing as scheduled, your surgery will be cancelled and rescheduled. 1st Post Operative appointment: *THIS WILL BE A VIRTUAL APPT* What to expect for your virtual appt: The  will call you around the appointment time to check you in and then you will be transferred to the nurse to get updated medications, weight, etc. The provider will then send a link via text message and the link will prompt you to do a virtual.  
 
10/14/2020 at 10:40 AM with Nestor Pizano NP Office Phone: [phone] For questions regarding this information please contact Manny Lobo at [phone]. Sincerely, Manny Lobo Surgical Scheduler Pre-Operative Instructions **DO NOT EAT ANYTHING AFTER MIDNIGHT THE NIGHT BEFORE YOUR SURGERY** 
 **YOU MAY HAVE CLEAR LIQUIDS UP TO ONE HOUR PRIOR TO YOUR ARRIVAL TIME -  This includes water, black coffee, Gatorade, Apple juice, and tea without cream or milk. YOU MAY DRINK UP TO 12 OUNCES OVER A PERIOD OF 4 HOURS. Please report to Patient Registration/Admitting at the time given to you by your Surgeons office  Located at the [address] [place] [address]. 
If  your physical condition changes (fever, cold, flu), please contact your Surgeon as soon as possible. DO BRING your Insurance card and a photo ID, such as a Drivers License. Valuables are to be left at home. DO NOT wear make-up, lotion, powder, perfume/cologne/aftershave, or nail polish (unless clear). You may wear deodorant. DO NOT wear metal objects such as jewelry or hair pins. Remove all piercings/body jewelry. WEAR COMFORTABLE CLOTHING THAT WILL BE EASY TO CHANGE INTO AFTER SURGERY. If you are staying overnight, please pack a bag and leave it in your vehicle until after surgery. We recommend that you pack your toiletry items, a robe/pajamas, slippers or any other items that you need for your comfort. Have a family member deliver your bag to your room after your surgery  the Hospital does not have a secure location to store these personal items. Please bring a hard-sided case for glasses, contact lenses, or hearing aids. Denture cups are provided by the  [facility] OR AFTER YOUR SURGERY. YOU SHOULD NOT OPERATE A VEHICLE FOR 24 HOURS AFTER RECEIVING SEDATION OR ANESTHESIA. Please arrange for a family member or friend to drive you home after your surgery: You will not be allowed to take a cab or drive yourself home. If you are discharged the day of surgery, it is recommended that you have someone stay with you until the next morning. For questions regarding the above information, please contact the [place] [address] office at [phone].  
 
 
[facility] 
 Patient Information and Instructions for Appointments and Procedures Instructions for your appointment: In order to practice social distancing, we are asking that all patients wait in their vehicles until the designated time for their appointment. Upon arrival at [facility], please park in the Cell Phone Lot located on [address] [place] [address] just past the parking deck and call Registration at [phone]. A registration associate will acknowledge your arrival and call you back when it is time for your appointment. At that time you will proceed to the main entrance of the hospital, where you will be screened, have your temperature checked and check in at patient registration. Guest of patients are not permitted to enter the facility unless needed for direct patient care. Please ensure that your guest has y our contact information, and drops patient off at the main entrance. Guest will then proceed back to the Cell Phone lot to wait for the patient to complete their appointment. Guests will be notified once patient is ready for . Process to ensure your safety ? Reduced Entry Points: We have temporarily reduced the entry points to our facilities to better protect the health and safety of all who enter. All patients, except emergency room patients must enter through the hospital Main Entrance on [address] [place] [address]. ? [facility] Masking, Screening Questions, and Temperature Checks: Upon entry, everyone is required to wear a facial mask, patients are encouraged to bring their own mask, if you do not have a mask, one will be provided to you. Every person will also be asked about their symptoms and have their temperature checked as part of our screening process. This helps us identify any potential COVID-19 exposures early on and take appropriate steps for the health and safety of all patients and health care professionals. ? Hand Sanitizing: Upon entry, all persons must sanitize their hands. ? Social Distancing: We have implemented social distancing guidelines in public areas, such as the [place] and [place]. Please remember to maintain appropriate distance  minim of 6 feet  between person in these areas. ? Visitation Restrictions: We have visitor restrictions in place and ask that people do not visit our facilities. While we recognize that this can be disappointing to our visitors and patients, the health of you and our community is our top priority. One adult guest is permitted only if they provide needed direct patient assistance. ? Cleaning and Disinfecting: In addition to our normal cleanliness standards, environmental cleaning and disinfecting procedures are being followed consistently and correctly according to CDC recommendations for COVID-19. 
? Waiting Area Outside of the Facility ([employer] Lot): In order to practice social distancing, we are asking that all patients wait in their vehicles until the designated time for their appointment.

## 2020-09-21 NOTE — PROGRESS NOTES
1. Have you been to the ER, urgent care clinic since your last visit? Hospitalized since your last visit? No    2. Have you seen or consulted any other health care providers outside of the [address] [place] [address] since your last visit? Include any pap smears or colon screening.  No

## 2020-09-21 NOTE — PROGRESS NOTES
Artist Paula is a 39 y.o. female who is referred by Lissette Graham NP for further evaluation of a lipoma of her RUE. Ms. Noel Najera tells me that she noted a subcutaneous mass on her right upper arm approximately two months ago. No significant change in the size of the mass. No associated drainage or bleeding. However, the mass is bothersome to her. Found to have a lipoma. She has otherwise been in her usual state of health. Past Medical History:   Diagnosis Date    Class 2 obesity in adult 9/21/2020    Joint pain     Joint pain     Lipoma of right upper extremity 9/21/2020    Multiple stiff joints     Neurological disorder      Past Surgical History:   Procedure Laterality Date    HX CHOLECYSTECTOMY      HX CYSTECTOMY      pilonidal cystectomy     Family History   Problem Relation Age of Onset    Stroke Mother     Heart Disease Father     Hypertension Father     Hypertension Sister     Cancer Maternal Grandmother         breast/ lung    Diabetes Maternal Grandfather     Stroke Maternal Grandfather      Social History     Socioeconomic History    Marital status:      Spouse name: Not on file    Number of children: Not on file    Years of education: Not on file    Highest education level: Not on file   Tobacco Use    Smoking status: Never Smoker    Smokeless tobacco: Never Used   Substance and Sexual Activity    Alcohol use: Yes     Review of systems negative except as noted. Review of Systems   Musculoskeletal: Positive for joint pain (Stiff Joints.) and myalgias. Discomfort at site of lipoma. Physical Exam  Vitals signs reviewed. Constitutional:       General: She is not in acute distress. Appearance: Normal appearance. She is obese. HENT:      Head: Normocephalic and atraumatic. Eyes:      General: No scleral icterus. Neck:      Musculoskeletal: Neck supple. Cardiovascular:      Rate and Rhythm: Normal rate and regular rhythm.    Pulmonary: Effort: Pulmonary effort is normal.      Breath sounds: Normal breath sounds. Abdominal:      Palpations: Abdomen is soft. Tenderness: There is no abdominal tenderness. Musculoskeletal: Normal range of motion. Lymphadenopathy:      Cervical: No cervical adenopathy. Skin:            Comments: Approx. 4cm x 4cm, well circumscribed, freely movable subcutaneous mass. No active infection. Clinically, this is c/w a lipoma. Neurological:      General: No focal deficit present. Mental Status: She is alert. ASSESSMENT and PLAN  In view of the findings on H and P, Ms. Martinez should benefit from excision of the lipoma as it is bothersome to her. Discussed procedure with her including risks of bleeding, infection, recurrence. She understands and wishes to proceed. I have tentatively scheduled Ms. Martinez for surgery on October 2, 2020 at [facility] and will see her back in the office postoperatively. She is agreeable to this plan of action and is most certainly free to contact the office should any questions or concerns arise.          CC: Juanis Olguin NP

## 2020-09-23 RX ORDER — BUPIVACAINE HYDROCHLORIDE 2.5 MG/ML
30 INJECTION, SOLUTION EPIDURAL; INFILTRATION; INTRACAUDAL ONCE
Status: CANCELLED | OUTPATIENT
Start: 2020-09-23 | End: 2020-09-23

## 2020-09-23 RX ORDER — ACETAMINOPHEN 325 MG/1
1000 TABLET ORAL ONCE
Status: CANCELLED | OUTPATIENT
Start: 2020-09-23 | End: 2020-09-23

## 2020-09-28 ENCOUNTER — HOSPITAL ENCOUNTER (OUTPATIENT)
Dept: PREADMISSION TESTING | Age: 45
Discharge: HOME | End: 2020-09-28
Payer: COMMERCIAL

## 2020-09-28 ENCOUNTER — TRANSCRIBE ORDER (OUTPATIENT)
Dept: REGISTRATION | Age: 45
End: 2020-09-28

## 2020-09-28 DIAGNOSIS — Z01.812 PRE-PROCEDURE LAB EXAM: Primary | ICD-10-CM

## 2020-09-28 DIAGNOSIS — Z01.812 PRE-PROCEDURE LAB EXAM: ICD-10-CM

## 2020-09-28 PROCEDURE — 87635 SARS-COV-2 COVID-19 AMP PRB: CPT

## 2020-09-29 ENCOUNTER — PATIENT MESSAGE (OUTPATIENT)
Dept: SURGERY | Age: 45
End: 2020-09-29

## 2020-09-29 ENCOUNTER — TELEPHONE (OUTPATIENT)
Dept: SURGERY | Age: 45
End: 2020-09-29

## 2020-09-29 LAB — SARS-COV-2, COV2NT: NOT DETECTED

## 2020-09-29 NOTE — TELEPHONE ENCOUNTER
LEFT PATIENT A VOICEMAIL TO CALL TO R/S SURGERY DUE TO DEATH IN FAMILY OF DR Alison Spicer. MY CHART MESSAGE SENT AS WELL.

## 2020-09-30 ENCOUNTER — PATIENT MESSAGE (OUTPATIENT)
Dept: SURGERY | Age: 45
End: 2020-09-30

## 2020-10-16 ENCOUNTER — HOSPITAL ENCOUNTER (OUTPATIENT)
Dept: PREADMISSION TESTING | Age: 45
Discharge: HOME | End: 2020-10-16
Payer: COMMERCIAL

## 2020-10-16 ENCOUNTER — TRANSCRIBE ORDER (OUTPATIENT)
Dept: REGISTRATION | Age: 45
End: 2020-10-16

## 2020-10-16 DIAGNOSIS — Z01.812 PRE-PROCEDURE LAB EXAM: Primary | ICD-10-CM

## 2020-10-16 DIAGNOSIS — Z01.812 PRE-PROCEDURE LAB EXAM: ICD-10-CM

## 2020-10-16 PROCEDURE — 87635 SARS-COV-2 COVID-19 AMP PRB: CPT

## 2020-10-18 LAB — SARS-COV-2, COV2NT: NOT DETECTED

## 2021-03-22 ENCOUNTER — OFFICE VISIT (OUTPATIENT)
Dept: SURGERY | Age: 46
End: 2021-03-22
Payer: COMMERCIAL

## 2021-03-22 VITALS
SYSTOLIC BLOOD PRESSURE: 145 MMHG | OXYGEN SATURATION: 97 % | WEIGHT: 255 LBS | TEMPERATURE: 98.1 F | HEART RATE: 84 BPM | DIASTOLIC BLOOD PRESSURE: 83 MMHG | RESPIRATION RATE: 18 BRPM | BODY MASS INDEX: 38.65 KG/M2 | HEIGHT: 68 IN

## 2021-03-22 DIAGNOSIS — D17.21 LIPOMA OF RIGHT UPPER EXTREMITY: Primary | ICD-10-CM

## 2021-03-22 PROCEDURE — 99212 OFFICE O/P EST SF 10 MIN: CPT | Performed by: SURGERY

## 2021-03-22 NOTE — PROGRESS NOTES
Shamika Martinez is a 45 y.o. female who returns for follow up.    Ms. Martinez was last seen on September 21, 2020 for evaluation of a lipoma on her right upper extremity. Doing well since then. No significant change in the size of the lipoma. However, it is becoming more bothersome to her.   She has otherwise been in her usual state of health.     Past Medical History:   Diagnosis Date   • Class 2 obesity in adult 9/21/2020   • GERD (gastroesophageal reflux disease)    • Joint pain    • Joint pain    • Lipoma of right upper extremity 9/21/2020   • Multiple stiff joints    • Nausea & vomiting    • Neurological disorder      Past Surgical History:   Procedure Laterality Date   • HX CYSTECTOMY      pilonidal cystectomy   • HX LAP CHOLECYSTECTOMY  2007   • HX OTHER SURGICAL  1998, 2005    PIOLODINAL CYST REMOVED     Family History   Problem Relation Age of Onset   • Stroke Mother    • Thyroid Disease Mother    • Heart Disease Father    • Hypertension Father    • Heart Failure Father    • Hypertension Sister    • Cancer Maternal Grandmother         breast/ lung   • Diabetes Maternal Grandfather    • Stroke Maternal Grandfather    • Anesth Problems Neg Hx      Social History     Socioeconomic History   • Marital status:      Spouse name: Not on file   • Number of children: Not on file   • Years of education: Not on file   • Highest education level: Not on file   Tobacco Use   • Smoking status: Never Smoker   • Smokeless tobacco: Never Used   Substance and Sexual Activity   • Alcohol use: Yes     Comment: OCCASIONALLY   • Drug use: Never     Review of systems negative except as noted.    Review of Systems   Musculoskeletal:        Discomfort at site of lipoma.      Physical Exam  Vitals signs reviewed.   Constitutional:       General: She is not in acute distress.     Appearance: Normal appearance. She is obese.   HENT:      Head: Normocephalic and atraumatic.   Neck:      Musculoskeletal: Neck supple.  Cardiovascular:      Rate and Rhythm: Normal rate and regular rhythm. Pulmonary:      Effort: Pulmonary effort is normal.      Breath sounds: Normal breath sounds. Abdominal:      General: There is no distension. Palpations: Abdomen is soft. Tenderness: There is no abdominal tenderness. Musculoskeletal: Normal range of motion. Lymphadenopathy:      Cervical: No cervical adenopathy. Skin:            Comments: There is a well circumscribed, freely movable subcutaneous mass which measures approximately 5cm x 4cm. Clinically, this is c/w a lipoma - no significant change in size from previous exam.   Neurological:      General: No focal deficit present. Mental Status: She is alert. ASSESSMENT and PLAN  In view of the findings on H and P, Ms. Martinez should benefit from excision of the lipoma on her right upper arm as it is bothersome to her. Discussed procedure with her including risks of bleeding, infection, need for further surgery, recurrence. She understands and wishes to proceed. I have tentatively scheduled Ms. Martinez for surgery on April 7, 2021 at [facility] and will see her back in the office postoperatively. She is agreeable to this plan of action and is most certainly free to contact the office should any questions or concerns arise.          CC: Nadia Charles, NP

## 2021-03-22 NOTE — PROGRESS NOTES
1. Have you been to the ER, urgent care clinic since your last visit? Hospitalized since your last visit? No    2. Have you seen or consulted any other health care providers outside of the [address] [place] [address] since your last visit? Include any pap smears or colon screening.  No

## 2021-03-23 RX ORDER — BUPIVACAINE HYDROCHLORIDE 2.5 MG/ML
30 INJECTION, SOLUTION EPIDURAL; INFILTRATION; INTRACAUDAL ONCE
Status: CANCELLED | OUTPATIENT
Start: 2021-03-23 | End: 2021-03-23

## 2021-03-23 RX ORDER — ACETAMINOPHEN 325 MG/1
1000 TABLET ORAL ONCE
Status: CANCELLED | OUTPATIENT
Start: 2021-03-23 | End: 2021-03-23

## 2021-04-12 ENCOUNTER — TRANSCRIBE ORDER (OUTPATIENT)
Dept: REGISTRATION | Age: 46
End: 2021-04-12

## 2021-04-12 ENCOUNTER — HOSPITAL ENCOUNTER (OUTPATIENT)
Dept: PREADMISSION TESTING | Age: 46
Discharge: HOME | End: 2021-04-12
Payer: COMMERCIAL

## 2021-04-12 DIAGNOSIS — Z01.812 PRE-PROCEDURE LAB EXAM: Primary | ICD-10-CM

## 2021-04-12 DIAGNOSIS — Z01.812 PRE-PROCEDURE LAB EXAM: ICD-10-CM

## 2021-04-12 PROCEDURE — U0003 INFECTIOUS AGENT DETECTION BY NUCLEIC ACID (DNA OR RNA); SEVERE ACUTE RESPIRATORY SYNDROME CORONAVIRUS 2 (SARS-COV-2) (CORONAVIRUS DISEASE [COVID-19]), AMPLIFIED PROBE TECHNIQUE, MAKING USE OF HIGH THROUGHPUT TECHNOLOGIES AS DESCRIBED BY CMS-2020-01-R: HCPCS

## 2021-04-14 LAB — SARS-COV-2, COV2NT: NOT DETECTED

## 2021-04-14 NOTE — PERIOP NOTES
PREOP INSTRUCTIONS GIVEN TO PT. VIA TELEPHONE INTERVIEW. PT BOUGHT CHG SOAP AS INSTRUCTED BY DR. Mcdermott Prim OFFICE. I SENT A TEXT TO HER PHONE OF THE INSTRUCTIONS. PT GIVEN OPPORTUNITY TO EXPRESS CONCERNS & ASK QUESTIONS. PT WAS GIVEN INSTRUCTIONS FOR REGISTRATION PROTOCOL DOS.

## 2021-04-16 ENCOUNTER — ANESTHESIA EVENT (OUTPATIENT)
Dept: SURGERY | Age: 46
End: 2021-04-16
Payer: COMMERCIAL

## 2021-04-16 ENCOUNTER — ANESTHESIA (OUTPATIENT)
Dept: SURGERY | Age: 46
End: 2021-04-16
Payer: COMMERCIAL

## 2021-04-16 ENCOUNTER — HOSPITAL ENCOUNTER (OUTPATIENT)
Age: 46
Setting detail: OUTPATIENT SURGERY
Discharge: HOME | End: 2021-04-16
Attending: SURGERY | Admitting: SURGERY
Payer: COMMERCIAL

## 2021-04-16 VITALS
DIASTOLIC BLOOD PRESSURE: 68 MMHG | BODY MASS INDEX: 38.14 KG/M2 | HEIGHT: 67 IN | RESPIRATION RATE: 13 BRPM | WEIGHT: 242.99 LBS | TEMPERATURE: 97.3 F | OXYGEN SATURATION: 94 % | SYSTOLIC BLOOD PRESSURE: 133 MMHG | HEART RATE: 69 BPM

## 2021-04-16 DIAGNOSIS — D17.21 LIPOMA OF RIGHT UPPER EXTREMITY: Primary | ICD-10-CM

## 2021-04-16 LAB — HCG UR QL: NEGATIVE

## 2021-04-16 PROCEDURE — 74011000250 HC RX REV CODE- 250: Performed by: NURSE ANESTHETIST, CERTIFIED REGISTERED

## 2021-04-16 PROCEDURE — 74011250636 HC RX REV CODE- 250/636: Performed by: NURSE ANESTHETIST, CERTIFIED REGISTERED

## 2021-04-16 PROCEDURE — 77030042556 HC PNCL CAUT -B: Performed by: SURGERY

## 2021-04-16 PROCEDURE — 88304 TISSUE EXAM BY PATHOLOGIST: CPT

## 2021-04-16 PROCEDURE — 77030010509 HC AIRWY LMA MSK TELE -A: Performed by: ANESTHESIOLOGY

## 2021-04-16 PROCEDURE — 77030031139 HC SUT VCRL2 J&J -A: Performed by: SURGERY

## 2021-04-16 PROCEDURE — 2709999900 HC NON-CHARGEABLE SUPPLY: Performed by: SURGERY

## 2021-04-16 PROCEDURE — 24071 EXC ARM/ELBOW LES SC 3 CM/>: CPT | Performed by: SURGERY

## 2021-04-16 PROCEDURE — 76210000016 HC OR PH I REC 1 TO 1.5 HR: Performed by: SURGERY

## 2021-04-16 PROCEDURE — 74011000250 HC RX REV CODE- 250: Performed by: SURGERY

## 2021-04-16 PROCEDURE — 76210000020 HC REC RM PH II FIRST 0.5 HR: Performed by: SURGERY

## 2021-04-16 PROCEDURE — 74011250636 HC RX REV CODE- 250/636: Performed by: SURGERY

## 2021-04-16 PROCEDURE — 74011250637 HC RX REV CODE- 250/637: Performed by: SURGERY

## 2021-04-16 PROCEDURE — 81025 URINE PREGNANCY TEST: CPT

## 2021-04-16 PROCEDURE — 76060000033 HC ANESTHESIA 1 TO 1.5 HR: Performed by: SURGERY

## 2021-04-16 PROCEDURE — 76010000149 HC OR TIME 1 TO 1.5 HR: Performed by: SURGERY

## 2021-04-16 PROCEDURE — 77030040922 HC BLNKT HYPOTHRM STRY -A

## 2021-04-16 PROCEDURE — 74011000258 HC RX REV CODE- 258: Performed by: NURSE ANESTHETIST, CERTIFIED REGISTERED

## 2021-04-16 PROCEDURE — 77030002933 HC SUT MCRYL J&J -A: Performed by: SURGERY

## 2021-04-16 PROCEDURE — 74011250636 HC RX REV CODE- 250/636: Performed by: ANESTHESIOLOGY

## 2021-04-16 RX ORDER — PHENYLEPHRINE HCL IN 0.9% NACL 0.4MG/10ML
SYRINGE (ML) INTRAVENOUS AS NEEDED
Status: DISCONTINUED | OUTPATIENT
Start: 2021-04-16 | End: 2021-04-16

## 2021-04-16 RX ORDER — ONDANSETRON 2 MG/ML
4 INJECTION INTRAMUSCULAR; INTRAVENOUS AS NEEDED
Status: DISCONTINUED | OUTPATIENT
Start: 2021-04-16 | End: 2021-04-16 | Stop reason: HOSPADM

## 2021-04-16 RX ORDER — MELATONIN 5 MG
5 CAPSULE ORAL
COMMUNITY

## 2021-04-16 RX ORDER — PROPOFOL 10 MG/ML
INJECTION, EMULSION INTRAVENOUS AS NEEDED
Status: DISCONTINUED | OUTPATIENT
Start: 2021-04-16 | End: 2021-04-16 | Stop reason: HOSPADM

## 2021-04-16 RX ORDER — HYDROMORPHONE HYDROCHLORIDE 1 MG/ML
0.2 INJECTION, SOLUTION INTRAMUSCULAR; INTRAVENOUS; SUBCUTANEOUS
Status: DISCONTINUED | OUTPATIENT
Start: 2021-04-16 | End: 2021-04-16 | Stop reason: HOSPADM

## 2021-04-16 RX ORDER — DEXAMETHASONE SODIUM PHOSPHATE 4 MG/ML
INJECTION, SOLUTION INTRA-ARTICULAR; INTRALESIONAL; INTRAMUSCULAR; INTRAVENOUS; SOFT TISSUE AS NEEDED
Status: DISCONTINUED | OUTPATIENT
Start: 2021-04-16 | End: 2021-04-16 | Stop reason: HOSPADM

## 2021-04-16 RX ORDER — ACETAMINOPHEN 325 MG/1
650 TABLET ORAL ONCE
Status: DISCONTINUED | OUTPATIENT
Start: 2021-04-16 | End: 2021-04-16 | Stop reason: HOSPADM

## 2021-04-16 RX ORDER — SODIUM CHLORIDE 0.9 % (FLUSH) 0.9 %
5-40 SYRINGE (ML) INJECTION AS NEEDED
Status: DISCONTINUED | OUTPATIENT
Start: 2021-04-16 | End: 2021-04-16 | Stop reason: HOSPADM

## 2021-04-16 RX ORDER — EPHEDRINE SULFATE/0.9% NACL/PF 50 MG/5 ML
5 SYRINGE (ML) INTRAVENOUS AS NEEDED
Status: DISCONTINUED | OUTPATIENT
Start: 2021-04-16 | End: 2021-04-16 | Stop reason: HOSPADM

## 2021-04-16 RX ORDER — BUPIVACAINE HYDROCHLORIDE 2.5 MG/ML
30 INJECTION, SOLUTION EPIDURAL; INFILTRATION; INTRACAUDAL ONCE
Status: COMPLETED | OUTPATIENT
Start: 2021-04-16 | End: 2021-04-16

## 2021-04-16 RX ORDER — FENTANYL CITRATE 50 UG/ML
50 INJECTION, SOLUTION INTRAMUSCULAR; INTRAVENOUS AS NEEDED
Status: DISCONTINUED | OUTPATIENT
Start: 2021-04-16 | End: 2021-04-16 | Stop reason: HOSPADM

## 2021-04-16 RX ORDER — SODIUM CHLORIDE 9 MG/ML
1000 INJECTION, SOLUTION INTRAVENOUS CONTINUOUS
Status: DISCONTINUED | OUTPATIENT
Start: 2021-04-16 | End: 2021-04-16 | Stop reason: HOSPADM

## 2021-04-16 RX ORDER — TRAMADOL HYDROCHLORIDE 50 MG/1
50 TABLET ORAL
Qty: 5 TAB | Refills: 0 | Status: SHIPPED | OUTPATIENT
Start: 2021-04-16 | End: 2021-04-19

## 2021-04-16 RX ORDER — SODIUM CHLORIDE, SODIUM LACTATE, POTASSIUM CHLORIDE, CALCIUM CHLORIDE 600; 310; 30; 20 MG/100ML; MG/100ML; MG/100ML; MG/100ML
125 INJECTION, SOLUTION INTRAVENOUS CONTINUOUS
Status: DISCONTINUED | OUTPATIENT
Start: 2021-04-16 | End: 2021-04-16 | Stop reason: HOSPADM

## 2021-04-16 RX ORDER — OXYCODONE HYDROCHLORIDE 5 MG/1
5 TABLET ORAL
Qty: 5 TAB | Refills: 0 | Status: SHIPPED | OUTPATIENT
Start: 2021-04-16 | End: 2021-04-19

## 2021-04-16 RX ORDER — SODIUM CHLORIDE 0.9 % (FLUSH) 0.9 %
5-40 SYRINGE (ML) INJECTION EVERY 8 HOURS
Status: DISCONTINUED | OUTPATIENT
Start: 2021-04-16 | End: 2021-04-16 | Stop reason: HOSPADM

## 2021-04-16 RX ORDER — LIDOCAINE HYDROCHLORIDE 10 MG/ML
0.1 INJECTION, SOLUTION EPIDURAL; INFILTRATION; INTRACAUDAL; PERINEURAL AS NEEDED
Status: DISCONTINUED | OUTPATIENT
Start: 2021-04-16 | End: 2021-04-16 | Stop reason: HOSPADM

## 2021-04-16 RX ORDER — MIDAZOLAM HYDROCHLORIDE 1 MG/ML
0.5 INJECTION, SOLUTION INTRAMUSCULAR; INTRAVENOUS
Status: DISCONTINUED | OUTPATIENT
Start: 2021-04-16 | End: 2021-04-16 | Stop reason: HOSPADM

## 2021-04-16 RX ORDER — ACETAMINOPHEN 500 MG
1000 TABLET ORAL ONCE
Status: COMPLETED | OUTPATIENT
Start: 2021-04-16 | End: 2021-04-16

## 2021-04-16 RX ORDER — FENTANYL CITRATE 50 UG/ML
25 INJECTION, SOLUTION INTRAMUSCULAR; INTRAVENOUS
Status: DISCONTINUED | OUTPATIENT
Start: 2021-04-16 | End: 2021-04-16 | Stop reason: HOSPADM

## 2021-04-16 RX ORDER — MIDAZOLAM HYDROCHLORIDE 1 MG/ML
INJECTION, SOLUTION INTRAMUSCULAR; INTRAVENOUS AS NEEDED
Status: DISCONTINUED | OUTPATIENT
Start: 2021-04-16 | End: 2021-04-16 | Stop reason: HOSPADM

## 2021-04-16 RX ORDER — MORPHINE SULFATE 2 MG/ML
2 INJECTION, SOLUTION INTRAMUSCULAR; INTRAVENOUS
Status: DISCONTINUED | OUTPATIENT
Start: 2021-04-16 | End: 2021-04-16 | Stop reason: HOSPADM

## 2021-04-16 RX ORDER — MIDAZOLAM HYDROCHLORIDE 1 MG/ML
1 INJECTION, SOLUTION INTRAMUSCULAR; INTRAVENOUS AS NEEDED
Status: DISCONTINUED | OUTPATIENT
Start: 2021-04-16 | End: 2021-04-16 | Stop reason: HOSPADM

## 2021-04-16 RX ORDER — SODIUM CHLORIDE 9 MG/ML
50 INJECTION, SOLUTION INTRAVENOUS CONTINUOUS
Status: DISCONTINUED | OUTPATIENT
Start: 2021-04-16 | End: 2021-04-16 | Stop reason: HOSPADM

## 2021-04-16 RX ORDER — ONDANSETRON 2 MG/ML
INJECTION INTRAMUSCULAR; INTRAVENOUS AS NEEDED
Status: DISCONTINUED | OUTPATIENT
Start: 2021-04-16 | End: 2021-04-16 | Stop reason: HOSPADM

## 2021-04-16 RX ORDER — OXYCODONE AND ACETAMINOPHEN 5; 325 MG/1; MG/1
1 TABLET ORAL AS NEEDED
Status: DISCONTINUED | OUTPATIENT
Start: 2021-04-16 | End: 2021-04-16 | Stop reason: HOSPADM

## 2021-04-16 RX ORDER — DIPHENHYDRAMINE HYDROCHLORIDE 50 MG/ML
12.5 INJECTION, SOLUTION INTRAMUSCULAR; INTRAVENOUS AS NEEDED
Status: DISCONTINUED | OUTPATIENT
Start: 2021-04-16 | End: 2021-04-16 | Stop reason: HOSPADM

## 2021-04-16 RX ORDER — LIDOCAINE HYDROCHLORIDE 20 MG/ML
INJECTION, SOLUTION EPIDURAL; INFILTRATION; INTRACAUDAL; PERINEURAL AS NEEDED
Status: DISCONTINUED | OUTPATIENT
Start: 2021-04-16 | End: 2021-04-16 | Stop reason: HOSPADM

## 2021-04-16 RX ORDER — FENTANYL CITRATE 50 UG/ML
INJECTION, SOLUTION INTRAMUSCULAR; INTRAVENOUS AS NEEDED
Status: DISCONTINUED | OUTPATIENT
Start: 2021-04-16 | End: 2021-04-16 | Stop reason: HOSPADM

## 2021-04-16 RX ADMIN — ONDANSETRON HYDROCHLORIDE 4 MG: 2 INJECTION, SOLUTION INTRAMUSCULAR; INTRAVENOUS at 07:46

## 2021-04-16 RX ADMIN — WATER 2 G: 1 INJECTION INTRAMUSCULAR; INTRAVENOUS; SUBCUTANEOUS at 07:40

## 2021-04-16 RX ADMIN — DEXAMETHASONE SODIUM PHOSPHATE 8 MG: 4 INJECTION, SOLUTION INTRAMUSCULAR; INTRAVENOUS at 07:46

## 2021-04-16 RX ADMIN — DEXMEDETOMIDINE HYDROCHLORIDE 10 MCG: 100 INJECTION, SOLUTION, CONCENTRATE INTRAVENOUS at 08:16

## 2021-04-16 RX ADMIN — DEXMEDETOMIDINE HYDROCHLORIDE 10 MCG: 100 INJECTION, SOLUTION, CONCENTRATE INTRAVENOUS at 07:56

## 2021-04-16 RX ADMIN — LIDOCAINE HYDROCHLORIDE 100 MG: 20 INJECTION, SOLUTION EPIDURAL; INFILTRATION; INTRACAUDAL; PERINEURAL at 07:31

## 2021-04-16 RX ADMIN — MIDAZOLAM 2 MG: 1 INJECTION INTRAMUSCULAR; INTRAVENOUS at 07:25

## 2021-04-16 RX ADMIN — ONDANSETRON 4 MG: 2 INJECTION INTRAMUSCULAR; INTRAVENOUS at 09:13

## 2021-04-16 RX ADMIN — FENTANYL CITRATE 100 MCG: 50 INJECTION, SOLUTION INTRAMUSCULAR; INTRAVENOUS at 07:25

## 2021-04-16 RX ADMIN — PROPOFOL 150 MG: 10 INJECTION, EMULSION INTRAVENOUS at 07:31

## 2021-04-16 RX ADMIN — ACETAMINOPHEN 1000 MG: 500 TABLET ORAL at 06:24

## 2021-04-16 RX ADMIN — SODIUM CHLORIDE, POTASSIUM CHLORIDE, SODIUM LACTATE AND CALCIUM CHLORIDE 125 ML/HR: 600; 310; 30; 20 INJECTION, SOLUTION INTRAVENOUS at 06:20

## 2021-04-16 NOTE — OP NOTES
[facility]  OPERATIVE REPORT    Name:  Cara White  MR#:  [id number]  :  1975  ACCOUNT #:  [de-identified]  DATE OF SERVICE:  2021      PREOPERATIVE DIAGNOSIS:  Lipoma, right upper arm. POSTOPERATIVE DIAGNOSIS:  Lipoma, right upper arm. PROCEDURE PERFORMED:  Excise lipoma, right upper arm. SURGEON:  Ismael Huynh MD    ASSISTANT:  Theodore Mejia SA    ANESTHESIA:  General via laryngeal mask airway. COMPLICATIONS:  None. SPECIMENS REMOVED:  Lipoma of the right upper arm to pathology. IMPLANTS:  None. ESTIMATED BLOOD LOSS:  Minimal.    IV FLUIDS:  Crystalloid 500 mL. DRAINS:  None. INDICATIONS FOR SURGERY:  The patient is a 51-year-old female with a well-circumscribed, freely movable subcutaneous mass on her right upper arm. Clinically and ultrasonographically, the mass is consistent with a lipoma. Ms. Shanel Nuno is brought to the operating room at this time for excision of the lipoma as it is bothersome to her. The risks of the procedure, including but not limited to, infection, bleeding, the need for further surgery and recurrence were discussed in detail with the patient. Ms. Shanel Nuno understood and wished to proceed. PROCEDURE:  After consent was obtained, the patient was brought to the operating room where she was placed in the supine position on the operating room table. Following the induction of an adequate level of general anesthesia via the laryngeal mask airway, compression devices were placed on both lower extremities. The right upper arm was prepped with ChloraPrep and draped as a sterile field. Local anesthetic was infiltrated and an incision over the lipoma was opened sharply. Subcutaneous bleeders were carefully cauterized. The incision was carried down to the lipoma which was readily identified, dissected free circumferentially and excised.   The specimen, which measured approximately 4 cm x 4 cm, was passed off the field and submitted for histopathologic evaluation. It should be noted that the lipoma extended down to but not beneath the fascia. The wound was inspected and there did not appear to be residual lipoma. Several bleeders were cauterized. The wound was irrigated copiously with saline, inspected, and found to be hemostatic. The surgical incision was closed with two layers of running 3-0 Vicryl suture followed by 4-0 Monocryl subcuticular suture to the skin. Additional local anesthetic was infiltrated and the surgical incision was dressed with Dermabond. The patient was awakened from her general anesthetic and the laryngeal mask airway was removed. She was transferred from the operating room table to the stretcher and brought to the recovery room in stable condition having tolerated the procedure well. At the conclusion of the procedure, all sponge counts, instrument counts and needle counts were reported as correct x2.       Kenyetta Houston MD      [id number]  D:  04/16/2021 8:28  T:  04/16/2021 15:48  JOB #:  [id number]  CC:  Salome Dailey NP

## 2021-04-16 NOTE — ANESTHESIA PREPROCEDURE EVALUATION
Relevant Problems   ENDOCRINE   (+) Class 2 obesity in adult       Anesthetic History   No history of anesthetic complications  PONV          Review of Systems / Medical History  Patient summary reviewed, nursing notes reviewed and pertinent labs reviewed    Pulmonary  Within defined limits                 Neuro/Psych   Within defined limits           Cardiovascular  Within defined limits                     GI/Hepatic/Renal  Within defined limits   GERD           Endo/Other  Within defined limits      Morbid obesity     Other Findings              Physical Exam    Airway  Mallampati: II  TM Distance: > 6 cm  Neck ROM: normal range of motion   Mouth opening: Normal     Cardiovascular  Regular rate and rhythm,  S1 and S2 normal,  no murmur, click, rub, or gallop             Dental  No notable dental hx       Pulmonary  Breath sounds clear to auscultation               Abdominal  GI exam deferred       Other Findings            Anesthetic Plan    ASA: 2  Anesthesia type: general          Induction: Intravenous  Anesthetic plan and risks discussed with: Patient

## 2021-04-16 NOTE — BRIEF OP NOTE
Brief Postoperative Note    Patient: Heidi Rose  YOB: 1975  MRN: [id number]    Date of Procedure: 4/16/2021     Pre-Op Diagnosis:  Lipoma Right Upper Arm. Post-Op Diagnosis:  Same. Procedure(s):   Excise Lipoma Right Upper Arm. Surgeon(s):  Glenny Shay MD    Surgical Assistant:  Chelsie Lombardo SA    Anesthesia: General     Estimated Blood Loss (mL): Minimal    Complications: None    Specimens:   ID Type Source Tests Collected by Time Destination   1 : right arm lipoma Fresh Arm, Right  Glenny Shay MD 4/16/2021 0809 Pathology        Implants: * No implants in log *    Drains: * No LDAs found *    Findings: Lipoma right upper arm. Approximately 4 cm x 4 cm.     Electronically Signed by Yesenia Mccormick MD on 4/16/2021 at 8:23 AM

## 2021-04-16 NOTE — H&P
Date of Surgery Update:  Timmothy Alpers was seen and examined. History and physical has been reviewed. The patient has been examined. There have been no significant clinical changes since the completion of the originally dated History and Physical.  Patient identified by surgeon; surgical site was confirmed by patient and surgeon. Signed By: Norma Asif MD     April 16, 2021 7:09 AM         Please note from the office and include the additional information below:    Past Medical History  Past Medical History:   Diagnosis Date    Class 2 obesity in adult 9/21/2020    GERD (gastroesophageal reflux disease)     Joint pain     Joint pain     Lipoma of right upper extremity 9/21/2020    Multiple stiff joints     Nausea & vomiting     Neurological disorder         Past Surgical History  Past Surgical History:   Procedure Laterality Date    HX CYSTECTOMY      pilonidal cystectomy    HX LAP CHOLECYSTECTOMY  2007    HX OTHER SURGICAL  1998, 2005    [address] CYST REMOVED        Social History  The patient Timmothy Alpers  reports that she has never smoked. She has never used smokeless tobacco. She reports current alcohol use. She reports that she does not use drugs.      Family History  Family History   Problem Relation Age of Onset   [place] Stroke Mother     Thyroid Disease Mother     Heart Disease Father     Hypertension Father     Heart Failure Father     Hypertension Sister     Cancer Maternal Grandmother         breast/ lung    Diabetes Maternal Grandfather     Stroke Maternal Grandfather     Anesth Problems Neg Hx

## 2021-04-16 NOTE — PERIOP NOTES
Pt stated she does not do will with Oxycodone and requested Tramadol. Spoke with Dr. Andrew Lemus and stated he will send in tramadol Rx to pharmacy.

## 2021-04-16 NOTE — DISCHARGE INSTRUCTIONS
Patient Discharge Instructions    Cleven Counter / [id number] : 1975    Admitted 2021 Discharged: 2021       · It is important that you take the medication exactly as they are prescribed. · Keep your medication in the bottles provided by the pharmacist and keep a list of the medication names, dosages, and times to be taken in your wallet. · Do not take other medications without consulting your doctor. What to do at Home    Recommended diet: Regular. Recommended activity: No Restrictions. No Driving While Taking Oxycodone. Tylenol 1000 mg every 6 hours as needed for pain. Ice pack to right upper arm as needed. Oxycodone as needed for severe pain. May Take Shower or [place] Roxo. If you experience any of the following symptoms Fevers, Chills, Nausea, Vomitting, Redness or Drainage at Surgical Site(s) or Any Other Questions or Concerns Please Call -  [phone]. Follow-up with Dr. Cedrick Kaplan in 10-14 days. Information obtained by :  I understand that if any problems occur once I am at home I am to contact my physician. I understand and acknowledge receipt of the instructions indicated above.                                                                                                                                            Physician's or R.N.'s Signature                                                                  Date/Time                                                                                                                                              Patient or Representative Signature                                                          Date/Time    ______________________________________________________________________    Anesthesia Discharge Instructions    After general anesthesia or intervenous sedation, for 24 hours or while taking prescription Narcotics:  · Limit your activities  · Do not drive or operate hazardous machinery  · If you have not urinated within 8 hours after discharge, please contact your surgeon on call. · Do not make important personal or business decisions  · Do not drink alcoholic beverages    Report the following to your surgeon:  · Excessive pain, swelling, redness or odor of or around the surgical area  · Temperature over 100.5 degrees  · Nausea and vomiting lasting longer than 4 hours or if unable to take medication  · Any signs of decreased circulation or nerve impairment to extremity:  Change in color, persistent numbness, tingling, coldness or increased pain.   · Any questions

## 2021-04-16 NOTE — PERIOP NOTES
Discharge instructions and medications reviewed with patient and  Venora Hidden. Opportunity was given for patient and responsible party to ask questions. No further questions at this time. Responsible party and patient verbalizes understanding of discharge instructions. A Copy of discharge instructions given to patient. Patient discharged with all belongings.

## 2021-04-16 NOTE — ANESTHESIA POSTPROCEDURE EVALUATION
Procedure(s):  EXCISE LIPOMA RIGHT UPPER ARM. general    Anesthesia Post Evaluation      Multimodal analgesia: multimodal analgesia used between 6 hours prior to anesthesia start to PACU discharge  Patient location during evaluation: PACU  Patient participation: complete - patient participated  Level of consciousness: awake  Pain score: 2  Pain management: adequate  Airway patency: patent  Anesthetic complications: no  Cardiovascular status: acceptable  Respiratory status: acceptable  Hydration status: acceptable  Comments: I have evaluated the patient and meets criteria for discharge from PACU. Paramjit Hauser MD  Post anesthesia nausea and vomiting:  controlled  Final Post Anesthesia Temperature Assessment:  Normothermia (36.0-37.5 degrees C)      INITIAL Post-op Vital signs:   Vitals Value Taken Time   /65 04/16/21 0840   Temp     Pulse 82 04/16/21 0841   Resp 11 04/16/21 0841   SpO2 94 % 04/16/21 0841   Vitals shown include unvalidated device data.

## 2021-04-28 ENCOUNTER — OFFICE VISIT (OUTPATIENT)
Dept: SURGERY | Age: 46
End: 2021-04-28
Payer: COMMERCIAL

## 2021-04-28 VITALS
RESPIRATION RATE: 18 BRPM | HEIGHT: 67 IN | TEMPERATURE: 98 F | HEART RATE: 87 BPM | DIASTOLIC BLOOD PRESSURE: 85 MMHG | SYSTOLIC BLOOD PRESSURE: 145 MMHG | OXYGEN SATURATION: 96 % | BODY MASS INDEX: 39.65 KG/M2 | WEIGHT: 252.6 LBS

## 2021-04-28 DIAGNOSIS — Z98.890 S/P EXCISION OF LIPOMA: ICD-10-CM

## 2021-04-28 DIAGNOSIS — Z86.018 S/P EXCISION OF LIPOMA: ICD-10-CM

## 2021-04-28 DIAGNOSIS — D17.21 LIPOMA OF RIGHT UPPER EXTREMITY: ICD-10-CM

## 2021-04-28 DIAGNOSIS — Z09 POSTOPERATIVE EXAMINATION: Primary | ICD-10-CM

## 2021-04-28 PROCEDURE — 99024 POSTOP FOLLOW-UP VISIT: CPT | Performed by: NURSE PRACTITIONER

## 2021-04-28 NOTE — PROGRESS NOTES
Subjective:    Osito Lees is a 39 y.o. female presents for postop care following excision of lipoma right upper arm on 4-. She is receiving wound care by self who reports no problems with wound care. Pain is controlled without any medications. .    Objective:     Visit Vitals  BP (!) 145/85 (BP 1 Location: Left upper arm, BP Patient Position: Sitting, BP Cuff Size: Adult long)   Pulse 87   Temp 98 °F (36.7 °C) (Oral)   Resp 18   Ht 5' 7\" (1.702 m)   Wt 252 lb 9.6 oz (114.6 kg)   LMP 04/02/2021 (Exact Date)   SpO2 96%   BMI 39.56 kg/m²         Wound:  Location: right upper arm  clean, dry, no drainage, healed  periwound skin intact, no erythema or induration        Assessment:     S/P excision of lipoma. Doing well postoperatively. Plan:     1. Wound care discussed. healed  2. Pt is to increase activities as tolerated. .  3. Follow-up prn    Ms. Satya Orozco has a reminder for a \"due or due soon\" health maintenance. I have asked that she contact her primary care provider for follow-up on this health maintenance. Patient verbalized understanding and agreement.

## 2021-04-28 NOTE — PROGRESS NOTES
Follow up for Excision of lipoma right upper arm on 4-.      1. Have you been to the ER, urgent care clinic since your last visit? Hospitalized since your last visit? No    2. Have you seen or consulted any other health care providers outside of the [address] [place] [address] since your last visit? Include any pap smears or colon screening.  No

## 2021-04-28 NOTE — LETTER
4/28/2021 Patient: Almaz Munguia YOB: 1975 Date of Visit: 4/28/2021 Christina Buckley NP 
[address] [address] Via Fax: [phone] Dear Christina Buckley NP, Thank you for referring Ms. Almaz Munguia to [facility] [address] for evaluation. My notes for this consultation are attached. If you have questions, please do not hesitate to call me. I look forward to following your patient along with you. Sincerely, Debo Lovelace NP

## 2021-04-28 NOTE — PATIENT INSTRUCTIONS
Lipoma: Care Instructions  Your Care Instructions  A lipoma is a growth of fat just below the skin. It may feel soft and rubbery. Lipomas can occur anywhere on the body. But they are most common on the torso, neck, upper thighs, upper arms, and armpits. A lipoma does not turn into cancer. Lipomas usually are not treated, because most of them don't hurt or cause problems. But your doctor may remove a lipoma if it is painful, gets infected, or bothers you. Follow-up care is a key part of your treatment and safety. Be sure to make and go to all appointments, and call your doctor if you are having problems. It's also a good idea to know your test results and keep a list of the medicines you take. How can you care for yourself at home? · A lipoma usually needs no care at home unless your doctor made a cut (incision) to remove it. · If your doctor told you how to care for your incision, follow your doctor's instructions. If you did not get instructions, follow this general advice:  ? Wash around the incision with clean water 2 times a day. Don't use hydrogen peroxide or alcohol. These can slow healing. ? You may cover the incision with a thin layer of petroleum jelly, such as Vaseline, and a nonstick bandage. ? Apply more petroleum jelly and replace the bandage as needed. When should you call for help? Call your doctor now or seek immediate medical care if:    · You have signs of infection, such as:  ? Increased pain, swelling, warmth, or redness. ? Red streaks leading from the lipoma. ? Pus draining from the lipoma. ? A fever. Watch closely for changes in your health, and be sure to contact your doctor if:    · The lipoma is growing or changing.     · You do not get better as expected. Where can you learn more? Go to http://www.gray.com/  Enter J054 in the search box to learn more about \"Lipoma: Care Instructions. \"  Current as of: July 2, 2020               Content Version: 12.8  © 5925-7779 [employer]. Care instructions adapted under license by [employer] (which disclaims liability or warranty for this information). If you have questions about a medical condition or this instruction, always ask your healthcare professional. [address] any warranty or liability for your use of this information.

## 2021-06-10 ENCOUNTER — TRANSCRIBE ORDER (OUTPATIENT)
Dept: GENERAL RADIOLOGY | Age: 46
End: 2021-06-10

## 2021-06-10 ENCOUNTER — HOSPITAL ENCOUNTER (OUTPATIENT)
Dept: GENERAL RADIOLOGY | Age: 46
Discharge: HOME | End: 2021-06-10
Attending: NURSE PRACTITIONER
Payer: COMMERCIAL

## 2021-06-10 DIAGNOSIS — Z82.61 FAMILY HISTORY OF ARTHRITIS: ICD-10-CM

## 2021-06-10 DIAGNOSIS — M79.10 MYALGIA: ICD-10-CM

## 2021-06-10 DIAGNOSIS — M15.9 PRIMARY OSTEOARTHRITIS INVOLVING MULTIPLE JOINTS: ICD-10-CM

## 2021-06-10 DIAGNOSIS — M15.9 PRIMARY OSTEOARTHRITIS INVOLVING MULTIPLE JOINTS: Primary | ICD-10-CM

## 2021-06-10 PROCEDURE — 73130 X-RAY EXAM OF HAND: CPT

## 2021-06-10 PROCEDURE — 73080 X-RAY EXAM OF ELBOW: CPT

## 2021-06-10 PROCEDURE — 73562 X-RAY EXAM OF KNEE 3: CPT

## 2021-11-02 ENCOUNTER — TELEPHONE (OUTPATIENT)
Dept: SURGERY | Age: 46
End: 2021-11-02

## 2021-11-02 NOTE — TELEPHONE ENCOUNTER
Called patient to remind them of their up coming appointment on Thursday, November 4th  Inform patient on cancellation policy and Covid regulations.

## 2021-11-03 ENCOUNTER — TRANSCRIBE ORDER (OUTPATIENT)
Dept: GENERAL RADIOLOGY | Age: 46
End: 2021-11-03

## 2021-11-03 ENCOUNTER — HOSPITAL ENCOUNTER (OUTPATIENT)
Dept: GENERAL RADIOLOGY | Age: 46
Discharge: HOME | End: 2021-11-03
Attending: INTERNAL MEDICINE
Payer: COMMERCIAL

## 2021-11-03 DIAGNOSIS — M47.819 SPONDYLOARTHRITIS: ICD-10-CM

## 2021-11-03 DIAGNOSIS — M47.819 SPONDYLOARTHRITIS: Primary | ICD-10-CM

## 2021-11-03 PROCEDURE — 72170 X-RAY EXAM OF PELVIS: CPT

## 2021-11-03 PROCEDURE — 72100 X-RAY EXAM L-S SPINE 2/3 VWS: CPT

## 2021-11-04 ENCOUNTER — OFFICE VISIT (OUTPATIENT)
Dept: SURGERY | Age: 46
End: 2021-11-04
Payer: COMMERCIAL

## 2021-11-04 VITALS
BODY MASS INDEX: 39.31 KG/M2 | DIASTOLIC BLOOD PRESSURE: 88 MMHG | TEMPERATURE: 98.2 F | WEIGHT: 250.5 LBS | OXYGEN SATURATION: 97 % | RESPIRATION RATE: 20 BRPM | SYSTOLIC BLOOD PRESSURE: 146 MMHG | HEIGHT: 67 IN | HEART RATE: 79 BPM

## 2021-11-04 DIAGNOSIS — M17.0 PRIMARY OSTEOARTHRITIS OF BOTH KNEES: ICD-10-CM

## 2021-11-04 DIAGNOSIS — K21.9 GASTROESOPHAGEAL REFLUX DISEASE WITHOUT ESOPHAGITIS: ICD-10-CM

## 2021-11-04 DIAGNOSIS — E66.01 MORBID OBESITY (HCC): Primary | ICD-10-CM

## 2021-11-04 PROBLEM — D17.21 LIPOMA OF RIGHT UPPER EXTREMITY: Status: RESOLVED | Noted: 2020-09-21 | Resolved: 2021-11-04

## 2021-11-04 PROCEDURE — 99214 OFFICE O/P EST MOD 30 MIN: CPT | Performed by: SURGERY

## 2021-11-04 RX ORDER — MELOXICAM 15 MG/1
TABLET ORAL
COMMUNITY
Start: 2021-10-12

## 2021-11-04 RX ORDER — PREDNISONE 5 MG/1
TABLET ORAL
COMMUNITY
Start: 2021-10-08

## 2021-11-04 NOTE — PROGRESS NOTES
1. Have you been to the ER, urgent care clinic since your last visit? Hospitalized since your last visit? new patient    2. Have you seen or consulted any other health care providers outside of the [address] [place] [address] since your last visit? Include any pap smears or colon screening.  New patient

## 2021-11-06 NOTE — PATIENT INSTRUCTIONS
Learning About Weight-Loss (Bariatric) Surgery  What is weight-loss surgery? Bariatric surgery is surgery to help you lose weight. This type of surgery is only used for people who are very overweight and have not been able to lose weight with diet and exercise. This surgery makes the stomach smaller. Some types of surgery also change the connection between your stomach and intestines. Having weight-loss surgery is a big step. After surgery, you'll need to make new, lifelong changes in how you eat and drink. How is weight-loss surgery done? Bariatric surgery may be either \"open\" or \"laparoscopic. \" Open surgery is done through a large cut (incision) in the belly. Laparoscopic surgery is done through several small cuts. The doctor puts a lighted tube, or scope, and other surgical tools through small cuts in your belly. The doctor is able to see your organs with the scope. There are different types of bariatric surgery. Gastric sleeve surgery  The surgery is usually done through several small incisions in the belly. The doctor removes more than half of your stomach. This leaves a thin sleeve, or tube, that is about the size of a banana. Because part of your stomach has been removed, this can't be reversed. Joseph-en-Y gastric bypass surgery  Joseph-en-Y (say \"nereida-en-why\") surgery changes the connection between the stomach and the intestines. The doctor separates a section of your stomach from the rest of your stomach. This makes a small pouch. The new pouch will hold the food you eat. The doctor connects the stomach pouch to the middle part of the small intestine. Gastric banding surgery  The surgery is usually done through several small incisions in the belly. The doctor wraps a band around the upper part of the stomach. This creates a small pouch. The small size of the pouch means that you will get full after you eat just a small amount of food.  The doctor can inflate or deflate the band to adjust the size. This lets the doctor adjust how quickly food passes from the new pouch into the stomach. It does not change the connection between the stomach and the intestines. What can you expect after the surgery? You may stay in the hospital for one or more days after the surgery. How long you stay depends on the type of surgery you had. Most people need 2 to 4 weeks before they are ready to get back to their usual routine. Your doctor will give you specific instructions about what to eat after the surgery. You'll start with only small amounts of soft foods and liquids. Bit by bit, you will be able to eat more solid foods. Your doctor may advise you to work with a dietitian. This way you'll be sure to get enough protein, vitamins, and minerals while you are losing weight. Even with a healthy diet, you may need to take vitamin and mineral supplements. After surgery, you will not be able to eat very much at one time. You will get full quickly. Try not to eat too much at one time or eat foods that are high in fat or sugar. If you do, you may vomit, get stomach pain, or have diarrhea. Weight loss  You probably will lose weight very quickly in the first few months after surgery. As time goes on, your weight loss will slow down. You will have regular doctor visits to check how you are doing. Emotions  It is common to have many emotions after this surgery. You may feel happy or excited as you begin to lose weight. But you may also feel overwhelmed or frustrated by the changes that you have to make in your diet, activity, and lifestyle. Talk with your doctor if you have concerns or questions. Think of bariatric surgery as a tool to help you lose weight. It isn't an instant fix. You will still need to eat a healthy diet and get regular exercise. This will help you reach your weight goal and avoid regaining the weight you lose. Follow-up care is a key part of your treatment and safety.  Be sure to make and go to all appointments, and call your doctor if you are having problems. It's also a good idea to know your test results and keep a list of the medicines you take. Where can you learn more? Go to http://www.gray.com/  Enter G469 in the search box to learn more about \"Learning About Weight-Loss (Bariatric) Surgery. \"  Current as of: March 17, 2021               Content Version: 13.0  © 3949-6300 [employer], [employer]. Care instructions adapted under license by [employer] (which disclaims liability or warranty for this information). If you have questions about a medical condition or this instruction, always ask your healthcare professional. [address] any warranty or liability for your use of this information.

## 2021-11-06 NOTE — PROGRESS NOTES
Bariatric Surgery Consultation    Subjective: The patient is a 42 y.o. obese  female with a Body mass index is 39.23 kg/m². .  The patient has been at her heaviest weight for the past 2 years;  she has been overweight since childhood;  she has been considering surgery since 2020; she desires surgery at this time because medical efforts weight loss been unsuccessful. Jenny Davis has tried multiple diets in her lifetime most recently tried unsupervised diets. \    Bariatric comorbidities present are   Patient Active Problem List   Diagnosis Code    Class 2 obesity in adult E66.9    Morbid obesity ([facility] [address] E66.01    Gastroesophageal reflux disease without esophagitis K21.9     The patient desires laparoscopic sleeve gastrectomy for surgical weight loss. The patients goal weight is 160 lbs. The highest acceptable weight is 180 lbs. These goals are consistent with expected outcomes of their desired operation. her Medical goals are joint pain improvement; her qualty of life goals are decreased fatigue.     Patient Active Problem List    Diagnosis Date Noted    Morbid obesity ([address] 11/04/2021    Gastroesophageal reflux disease without esophagitis 11/04/2021    Class 2 obesity in adult 09/21/2020      Past Surgical History:   Procedure Laterality Date    HX CHOLECYSTECTOMY  2008    HX CYSTECTOMY      pilonidal cystectomy    HX LAP CHOLECYSTECTOMY  2007    HX OTHER SURGICAL  1998, 2005    PIOLODINAL CYST REMOVED      Social History     Tobacco Use    Smoking status: Never Smoker    Smokeless tobacco: Never Used   Substance Use Topics    Alcohol use: Yes     Comment: OCCASIONALLY      Family History   Problem Relation Age of Onset   Leela Fang Stroke Mother     Thyroid Disease Mother     Heart Disease Father     Hypertension Father     Heart Failure Father     Hypertension Sister     Cancer Maternal Grandmother         breast/ lung    Diabetes Maternal Grandfather     Stroke Maternal Grandfather  Anesth Problems Neg Hx       Prior to Admission medications    Medication Sig Start Date End Date Taking? Authorizing Provider   melatonin 5 mg cap capsule Take 5 mg by mouth nightly. Yes Provider, Historical   cetirizine HCl (ZYRTEC PO) Take 1 Tab by mouth as needed. Yes Provider, Historical   multivit-minerals/ferrous fum (MULTI VITAMIN PO) Take 1 Tab by mouth daily. Yes Provider, Historical   meloxicam (MOBIC) 15 mg tablet  10/12/21   Provider, Historical   predniSONE (DELTASONE) 5 mg tablet  10/8/21   Provider, Historical     Allergies   Allergen Reactions    Codeine Rash and Nausea and Vomiting         Review of Systems:    Review of Systems   Constitutional: Negative for chills, fever and weight loss. HENT: Negative. Eyes: Negative. Respiratory: Positive for shortness of breath. Negative for cough and wheezing. Cardiovascular: Positive for leg swelling. Negative for chest pain and palpitations. Gastrointestinal: Positive for heartburn. Negative for abdominal pain, nausea and vomiting. Genitourinary: Positive for frequency. Musculoskeletal: Positive for back pain, joint pain and neck pain. Skin: Negative. Neurological: Positive for tingling. Endo/Heme/Allergies: Negative. Psychiatric/Behavioral: The patient is nervous/anxious. Objective:     Visit Vitals  BP (!) 146/88 (BP 1 Location: Right upper arm, BP Patient Position: Sitting, BP Cuff Size: Large adult)   Pulse 79   Temp 98.2 °F (36.8 °C)   Resp 20   Ht 5' 7\" (1.702 m)   Wt 250 lb 8 oz (113.6 kg)   LMP 04/02/2021 (Exact Date)   SpO2 97%   BMI 39.23 kg/m²       Physical Exam:  GENERAL: alert, cooperative, no distress, appears stated age, morbidly obese, EYE: negative, LYMPHATIC: No cervical or supraclavicular adenopathy. THROAT & NECK: normal, LUNG: clear to auscultation bilaterally, HEART: regular rate and rhythm, S1, S2 normal, no murmur. ABDOMEN: Obese, active bowel sounds, nondistended, soft.   No pain with palpation, mass, or hernia. EXTREMITIES:  extremities normal, atraumatic, no cyanosis or edema, SKIN: Normal., NEUROLOGIC: negative      Assessment:     Morbid obesity with comorbidity; no success with medical management. Plan:     laparoscopic sleeve gastrectomy    This is a 42 y.o. female with a BMI of Body mass index is 39.23 kg/m². and the weight-related co-morbidities of GERD, DJD. Harsh Rubin meets the NIH criteria for bariatric surgery based upon the BMI of Body mass index is 39.23 kg/m². and multiple weight-related co-morbidities. Harsh Rubin has elected laparoscopic sleeve gastrectomy as her intervention of choice for treatment of morbid obesity through surgical means secondary to its balance of safety and efficacy. In the office today, following Lucero Aguayo's history and physical examination, a 30 minute discussion regarding the anatomic alterations for the laparoscopic sleeve gastrectomy was undertaken. The dietary expectations and the patient and physician dependent factors for success were thoroughly discussed, to include the need for interval follow-up and long-term dietary changes associated with success. The possible complications of the pastor-en-Y gastric bypass  were also discussed, to include VTE, staple line leak, bleeding, stricture, infection, GERD, open procedure, poor weight loss/weight regain, sleeve dilation. Specific weight related outcomes for success were also discussed with an emphasis on careful and close follow-up with the first year. The patient expressed an understanding of the above factors, and her questions were answered in their entirety. In addition, the patient attended a 1.5 hour power point seminar regarding obesity, surgical weight loss including, adjustable gastric band, gastric bypass, and sleeve gastrectomy.   This discussion contrasted the different surgical techniques, mechanisms of actions and expected outcomes, and surgical and medical risks associated with each procedure. During this seminar, there was a long question and answer session where each questions was answered until there were no additional questions. Today, the patient had all of her questions answered and desires to proceed with pre-qualification for bariatric surgery initially choosing laparoscopic sleeve gastrectomy as her surgical option. She will schedule psychology evaluation and initiate supervised medical weight loss program.  We will order dietitian assessment and upper gastrointestinal series to assess for hiatal hernia given her reflux symptoms. She will continue follow-up with her rheumatologist regarding optimal management of her DJD which may represent an autoimmune form of arthritis.       Signed By: Danyel Campbell MD     November 6, 2021

## 2022-06-17 ENCOUNTER — TRANSCRIBE ORDER (OUTPATIENT)
Dept: SCHEDULING | Age: 47
End: 2022-06-17

## 2022-06-17 DIAGNOSIS — M79.89 CALF SWELLING: Primary | ICD-10-CM

## (undated) DEVICE — PREP SKN CHLRAPRP APL 26ML STR --

## (undated) DEVICE — SPONGE GZ W4XL4IN COT 12 PLY TYP VII WVN C FLD DSGN

## (undated) DEVICE — NEEDLE HYPO 25GA L1.5IN BVL ORIENTED ECLIPSE

## (undated) DEVICE — SUTURE VCRL SZ 3-0 L27IN ABSRB VLT L26MM SH 1/2 CIR J316H

## (undated) DEVICE — INFECTION CONTROL KIT SYS

## (undated) DEVICE — STRAP,POSITIONING,KNEE/BODY,FOAM,4X60": Brand: MEDLINE

## (undated) DEVICE — DRAPE,LAPAROTOMY,T,PEDI,STERILE: Brand: MEDLINE

## (undated) DEVICE — INTENDED FOR TISSUE SEPARATION, AND OTHER PROCEDURES THAT REQUIRE A SHARP SURGICAL BLADE TO PUNCTURE OR CUT.: Brand: BARD-PARKER ® CARBON RIB-BACK BLADES

## (undated) DEVICE — STERILE POLYISOPRENE POWDER-FREE SURGICAL GLOVES WITH EMOLLIENT COATING: Brand: PROTEXIS

## (undated) DEVICE — SUTURE MCRYL SZ 4-0 L18IN ABSRB UD L19MM PS-2 3/8 CIR PRIM Y496G

## (undated) DEVICE — SURGICAL PROCEDURE PACK BASIN MAJ SET CUST NO CAUT

## (undated) DEVICE — TOWEL SURG W17XL27IN STD BLU COT NONFENESTRATED PREWASHED

## (undated) DEVICE — PACK,BASIC,SIRUS,V: Brand: MEDLINE

## (undated) DEVICE — PENCIL SMK EVAC 10 FT BLADE ELECTRD ROCKER FOR TELSCP

## (undated) DEVICE — SYR 10ML LUER LOK 1/5ML GRAD --

## (undated) DEVICE — REM POLYHESIVE ADULT PATIENT RETURN ELECTRODE: Brand: VALLEYLAB

## (undated) DEVICE — SOL IRR SOD CL 0.9% 1000ML BTL --